# Patient Record
Sex: MALE | Race: WHITE | NOT HISPANIC OR LATINO | Employment: OTHER | URBAN - METROPOLITAN AREA
[De-identification: names, ages, dates, MRNs, and addresses within clinical notes are randomized per-mention and may not be internally consistent; named-entity substitution may affect disease eponyms.]

---

## 2020-12-07 ENCOUNTER — PATIENT MESSAGE (OUTPATIENT)
Dept: UROLOGY | Facility: CLINIC | Age: 70
End: 2020-12-07

## 2020-12-22 ENCOUNTER — OFFICE VISIT (OUTPATIENT)
Dept: UROLOGY | Facility: CLINIC | Age: 70
End: 2020-12-22
Payer: COMMERCIAL

## 2020-12-22 ENCOUNTER — LAB VISIT (OUTPATIENT)
Dept: LAB | Facility: HOSPITAL | Age: 70
End: 2020-12-22
Attending: UROLOGY
Payer: COMMERCIAL

## 2020-12-22 VITALS — TEMPERATURE: 97 F | SYSTOLIC BLOOD PRESSURE: 142 MMHG | WEIGHT: 207.25 LBS | DIASTOLIC BLOOD PRESSURE: 86 MMHG

## 2020-12-22 DIAGNOSIS — R31.29 MICROHEMATURIA: ICD-10-CM

## 2020-12-22 DIAGNOSIS — R97.20 ELEVATED PSA: ICD-10-CM

## 2020-12-22 DIAGNOSIS — R97.20 ELEVATED PSA: Primary | ICD-10-CM

## 2020-12-22 LAB
BILIRUB SERPL-MCNC: NORMAL MG/DL
BLOOD URINE, POC: NORMAL
CLARITY, POC UA: CLEAR
COLOR, POC UA: YELLOW
GLUCOSE UR QL STRIP: NORMAL
KETONES UR QL STRIP: NORMAL
LEUKOCYTE ESTERASE URINE, POC: NORMAL
NITRITE, POC UA: NORMAL
PH, POC UA: 6
PROTEIN, POC: NORMAL
SPECIFIC GRAVITY, POC UA: 1.01
UROBILINOGEN, POC UA: NORMAL

## 2020-12-22 PROCEDURE — 99999 PR PBB SHADOW E&M-EST. PATIENT-LVL III: CPT | Mod: PBBFAC,,, | Performed by: UROLOGY

## 2020-12-22 PROCEDURE — 99213 OFFICE O/P EST LOW 20 MIN: CPT | Mod: 25,S$GLB,, | Performed by: UROLOGY

## 2020-12-22 PROCEDURE — 1126F PR PAIN SEVERITY QUANTIFIED, NO PAIN PRESENT: ICD-10-PCS | Mod: S$GLB,,, | Performed by: UROLOGY

## 2020-12-22 PROCEDURE — 1159F PR MEDICATION LIST DOCUMENTED IN MEDICAL RECORD: ICD-10-PCS | Mod: S$GLB,,, | Performed by: UROLOGY

## 2020-12-22 PROCEDURE — 81002 URINALYSIS NONAUTO W/O SCOPE: CPT | Mod: S$GLB,,, | Performed by: UROLOGY

## 2020-12-22 PROCEDURE — 81001 URINALYSIS AUTO W/SCOPE: CPT

## 2020-12-22 PROCEDURE — 81002 POCT URINE DIPSTICK WITHOUT MICROSCOPE: ICD-10-PCS | Mod: S$GLB,,, | Performed by: UROLOGY

## 2020-12-22 PROCEDURE — 1101F PR PT FALLS ASSESS DOC 0-1 FALLS W/OUT INJ PAST YR: ICD-10-PCS | Mod: CPTII,S$GLB,, | Performed by: UROLOGY

## 2020-12-22 PROCEDURE — 84153 ASSAY OF PSA TOTAL: CPT

## 2020-12-22 PROCEDURE — 3288F PR FALLS RISK ASSESSMENT DOCUMENTED: ICD-10-PCS | Mod: CPTII,S$GLB,, | Performed by: UROLOGY

## 2020-12-22 PROCEDURE — 36415 COLL VENOUS BLD VENIPUNCTURE: CPT | Mod: PO

## 2020-12-22 PROCEDURE — 1101F PT FALLS ASSESS-DOCD LE1/YR: CPT | Mod: CPTII,S$GLB,, | Performed by: UROLOGY

## 2020-12-22 PROCEDURE — 3288F FALL RISK ASSESSMENT DOCD: CPT | Mod: CPTII,S$GLB,, | Performed by: UROLOGY

## 2020-12-22 PROCEDURE — 1126F AMNT PAIN NOTED NONE PRSNT: CPT | Mod: S$GLB,,, | Performed by: UROLOGY

## 2020-12-22 PROCEDURE — 1159F MED LIST DOCD IN RCRD: CPT | Mod: S$GLB,,, | Performed by: UROLOGY

## 2020-12-22 PROCEDURE — 99999 PR PBB SHADOW E&M-EST. PATIENT-LVL III: ICD-10-PCS | Mod: PBBFAC,,, | Performed by: UROLOGY

## 2020-12-22 PROCEDURE — 99213 PR OFFICE/OUTPT VISIT, EST, LEVL III, 20-29 MIN: ICD-10-PCS | Mod: 25,S$GLB,, | Performed by: UROLOGY

## 2020-12-22 RX ORDER — MESALAMINE 1.2 G/1
TABLET, DELAYED RELEASE ORAL
COMMUNITY
Start: 2020-12-21

## 2020-12-22 RX ORDER — SILDENAFIL 100 MG/1
100 TABLET, FILM COATED ORAL DAILY PRN
COMMUNITY
Start: 2020-06-08

## 2020-12-22 RX ORDER — ZOLPIDEM TARTRATE 12.5 MG/1
12.5 TABLET, FILM COATED, EXTENDED RELEASE ORAL
COMMUNITY
Start: 2020-11-16

## 2020-12-22 RX ORDER — MELOXICAM 15 MG/1
15 TABLET ORAL
COMMUNITY
Start: 2020-08-19 | End: 2022-09-07

## 2020-12-22 RX ORDER — PRIMIDONE 50 MG/1
50 TABLET ORAL
COMMUNITY
Start: 2020-08-07 | End: 2021-05-28

## 2020-12-22 RX ORDER — CALCIPOTRIENE 50 UG/G
CREAM TOPICAL
COMMUNITY
Start: 2020-11-17

## 2020-12-22 RX ORDER — ATORVASTATIN CALCIUM 20 MG/1
20 TABLET, FILM COATED ORAL
COMMUNITY
Start: 2020-08-19

## 2020-12-22 RX ORDER — AMITRIPTYLINE HYDROCHLORIDE 100 MG/1
100 TABLET ORAL
COMMUNITY
Start: 2020-11-16

## 2020-12-22 RX ORDER — OLMESARTAN MEDOXOMIL 20 MG/1
TABLET ORAL
COMMUNITY
Start: 2020-11-13

## 2020-12-22 NOTE — PROGRESS NOTES
Chief Complaint   Patient presents with    Follow-up     6mo follow up, former pt from [facility]         History of Present Illness:   Juan C Ferguson is a 70 y.o. male here for follow up elevated PSA. No nocturia. No gross hematuria. No dysuria. No stranguria. Some frequency but drinks tea.     Past  history:   TRUS biopsy 5/2019 PSA of 4.3, benign  MRI PI-RADS 4 12/2019, 67cc prostate  MR/US fusion biopsy 2/2020, benign, PSA 6.23    Review of Systems:   Constitutional: Pt denies fever, chills, change in appetite or unintentional weight loss  HENT: No drooling, loss of hearing, mouth sores, facial swelling  Eyes: No photophobia, visual disturbance or eye pain  Respiratory: No cough, dyspnea, wheezing  Cardiovascular: No chest pain, palpitations or leg swelling  GI: No constipation, diarrhea, nausea, vomiting, melena or hematochezia  : No genital lesions, discharge, nocturnal enuresis  Endocrine: No polydipsia, polyphagia, heat or cold intolerance  Musculoskeletal: No joint swelling, back pain or bone pain  Integument: No color change, rash or wounds  Neurological: No seizures, speech difficulty or tremors  Psychiatric: No confusion, self-harm or Hallucinations    Current Outpatient Medications   Medication Sig Dispense Refill    amitriptyline (ELAVIL) 100 MG tablet Take 100 mg by mouth.      atorvastatin (LIPITOR) 20 MG tablet Take 20 mg by mouth.      calcipotriene (DOVONOX) 0.005 % cream Apply topically.      meloxicam (MOBIC) 15 MG tablet Take 15 mg by mouth.      mesalamine (LIALDA) 1.2 gram TbEC       olmesartan (BENICAR) 20 MG tablet       primidone (MYSOLINE) 50 MG Tab Take 50 mg by mouth.      sildenafiL (VIAGRA) 100 MG tablet Take 100 mg by mouth daily as needed.      zolpidem (AMBIEN CR) 12.5 MG CR tablet Take 12.5 mg by mouth.       No current facility-administered medications for this visit.        Review of patient's allergies indicates:   Allergen Reactions    Sulfa (sulfonamide antibiotics)  Rash       Past medical, family, and social history reviewed as documented in chart with pertinent positive medical, family, and social history detailed in HPI.    Physical Exam  Vitals:    12/22/20 0807   BP: (!) 142/86   Temp: 97.2 °F (36.2 °C)       General: Well-developed, well-nourished, in no acute distress  HEENT: Normocephalic, atraumatic, extraocular eye movements in tact  Neck: supple, trachea midline, no cervical or supraclavicular adenopathy  Respirations: Even and unlabored  Abdomen: soft, Non-tender, Non-distended, no palpable masses, no rebound or guarding  Rectal:>40gram prostate without nodules or tenderness. No gross blood.   Extremities: Moves all extremities equally, atraumatic, no clubbing, cyanosis, edema  Skin: warm and dry, no lesions  Psych: normal affect  Neuro: Alert and oriented x 3. Cranial nerves II-XII grossly intact    Urinalysis  Trace blood    Assessment:   1. Elevated PSA  Prostate Specific Antigen, Diagnostic    Prostate Specific Antigen, Diagnostic   2. Microhematuria  Urinalysis Microscopic         Plan:  Elevated PSA  -     Prostate Specific Antigen, Diagnostic; Future; Expected date: 12/22/2020  -     Prostate Specific Antigen, Diagnostic; Future; Expected date: 06/22/2021    Microhematuria  -     Urinalysis Microscopic        Follow up in about 6 months (around 6/22/2021).

## 2020-12-23 ENCOUNTER — PATIENT MESSAGE (OUTPATIENT)
Dept: UROLOGY | Facility: CLINIC | Age: 70
End: 2020-12-23

## 2020-12-23 DIAGNOSIS — R97.20 ELEVATED PSA: Primary | ICD-10-CM

## 2020-12-23 LAB
COMPLEXED PSA SERPL-MCNC: 11 NG/ML (ref 0–4)
MICROSCOPIC COMMENT: NORMAL
RBC #/AREA URNS AUTO: 1 /HPF (ref 0–4)
WBC #/AREA URNS AUTO: 0 /HPF (ref 0–5)

## 2021-01-05 ENCOUNTER — IMMUNIZATION (OUTPATIENT)
Dept: INTERNAL MEDICINE | Facility: CLINIC | Age: 71
End: 2021-01-05
Payer: MEDICARE

## 2021-01-05 DIAGNOSIS — Z23 NEED FOR VACCINATION: ICD-10-CM

## 2021-01-05 PROCEDURE — 91300 COVID-19, MRNA, LNP-S, PF, 30 MCG/0.3 ML DOSE VACCINE: CPT | Mod: PBBFAC | Performed by: FAMILY MEDICINE

## 2021-01-08 ENCOUNTER — HOSPITAL ENCOUNTER (OUTPATIENT)
Dept: RADIOLOGY | Facility: HOSPITAL | Age: 71
Discharge: HOME | End: 2021-01-08
Attending: UROLOGY
Payer: COMMERCIAL

## 2021-01-08 ENCOUNTER — LAB VISIT (OUTPATIENT)
Dept: LAB | Facility: HOSPITAL | Age: 71
End: 2021-01-08
Attending: UROLOGY
Payer: COMMERCIAL

## 2021-01-08 DIAGNOSIS — R97.20 ELEVATED PSA: ICD-10-CM

## 2021-01-08 LAB
BUN SERPL-MCNC: 18 MG/DL (ref 8–23)
CREAT SERPL-MCNC: 0.9 MG/DL (ref 0.5–1.4)
EST. GFR  (AFRICAN AMERICAN): >60 ML/MIN/1.73 M^2
EST. GFR  (NON AFRICAN AMERICAN): >60 ML/MIN/1.73 M^2

## 2021-01-08 PROCEDURE — 84520 ASSAY OF UREA NITROGEN: CPT

## 2021-01-08 PROCEDURE — 82565 ASSAY OF CREATININE: CPT

## 2021-01-08 PROCEDURE — 72197 MRI PELVIS W/O & W/DYE: CPT | Mod: 26,,, | Performed by: RADIOLOGY

## 2021-01-08 PROCEDURE — 84153 ASSAY OF PSA TOTAL: CPT

## 2021-01-08 PROCEDURE — 72197 MRI PELVIS W/O & W/DYE: CPT | Mod: TC

## 2021-01-08 PROCEDURE — 25500020 PHARM REV CODE 255: Performed by: UROLOGY

## 2021-01-08 PROCEDURE — 72197 MRI PROSTATE W W/O CONTRAST: ICD-10-PCS | Mod: 26,,, | Performed by: RADIOLOGY

## 2021-01-08 PROCEDURE — 36415 COLL VENOUS BLD VENIPUNCTURE: CPT

## 2021-01-08 PROCEDURE — A9585 GADOBUTROL INJECTION: HCPCS | Performed by: UROLOGY

## 2021-01-08 RX ORDER — GADOBUTROL 604.72 MG/ML
10 INJECTION INTRAVENOUS
Status: COMPLETED | OUTPATIENT
Start: 2021-01-08 | End: 2021-01-08

## 2021-01-08 RX ADMIN — GADOBUTROL 10 ML: 604.72 INJECTION INTRAVENOUS at 05:01

## 2021-01-09 LAB — COMPLEXED PSA SERPL-MCNC: 4.9 NG/ML (ref 0–4)

## 2021-01-12 ENCOUNTER — TELEPHONE (OUTPATIENT)
Dept: UROLOGY | Facility: CLINIC | Age: 71
End: 2021-01-12

## 2021-01-27 ENCOUNTER — IMMUNIZATION (OUTPATIENT)
Dept: INTERNAL MEDICINE | Facility: CLINIC | Age: 71
End: 2021-01-27
Payer: MEDICARE

## 2021-01-27 DIAGNOSIS — Z23 NEED FOR VACCINATION: Primary | ICD-10-CM

## 2021-01-27 PROCEDURE — 91300 COVID-19, MRNA, LNP-S, PF, 30 MCG/0.3 ML DOSE VACCINE: CPT | Mod: PBBFAC | Performed by: FAMILY MEDICINE

## 2021-01-27 PROCEDURE — 0002A COVID-19, MRNA, LNP-S, PF, 30 MCG/0.3 ML DOSE VACCINE: CPT | Mod: PBBFAC | Performed by: FAMILY MEDICINE

## 2021-05-28 ENCOUNTER — LAB VISIT (OUTPATIENT)
Dept: LAB | Facility: HOSPITAL | Age: 71
End: 2021-05-28
Attending: UROLOGY
Payer: COMMERCIAL

## 2021-05-28 ENCOUNTER — OFFICE VISIT (OUTPATIENT)
Dept: UROLOGY | Facility: CLINIC | Age: 71
End: 2021-05-28
Payer: COMMERCIAL

## 2021-05-28 VITALS — SYSTOLIC BLOOD PRESSURE: 136 MMHG | DIASTOLIC BLOOD PRESSURE: 94 MMHG | WEIGHT: 209.44 LBS

## 2021-05-28 DIAGNOSIS — R31.29 MICROHEMATURIA: Primary | ICD-10-CM

## 2021-05-28 DIAGNOSIS — R97.20 ELEVATED PSA: ICD-10-CM

## 2021-05-28 LAB
BACTERIA #/AREA URNS AUTO: NORMAL /HPF
BILIRUB SERPL-MCNC: NORMAL MG/DL
BLOOD URINE, POC: 50
CLARITY, POC UA: CLEAR
COLOR, POC UA: YELLOW
COMPLEXED PSA SERPL-MCNC: 4.4 NG/ML (ref 0–4)
GLUCOSE UR QL STRIP: NORMAL
KETONES UR QL STRIP: NORMAL
LEUKOCYTE ESTERASE URINE, POC: NORMAL
MICROSCOPIC COMMENT: NORMAL
NITRITE, POC UA: NORMAL
PH, POC UA: 6
PROTEIN, POC: NORMAL
RBC #/AREA URNS AUTO: 4 /HPF (ref 0–4)
SPECIFIC GRAVITY, POC UA: 1.02
UROBILINOGEN, POC UA: NORMAL
WBC #/AREA URNS AUTO: 0 /HPF (ref 0–5)

## 2021-05-28 PROCEDURE — 84153 ASSAY OF PSA TOTAL: CPT | Performed by: UROLOGY

## 2021-05-28 PROCEDURE — 99999 PR PBB SHADOW E&M-EST. PATIENT-LVL III: ICD-10-PCS | Mod: PBBFAC,,, | Performed by: UROLOGY

## 2021-05-28 PROCEDURE — 1101F PT FALLS ASSESS-DOCD LE1/YR: CPT | Mod: CPTII,S$GLB,, | Performed by: UROLOGY

## 2021-05-28 PROCEDURE — 3288F PR FALLS RISK ASSESSMENT DOCUMENTED: ICD-10-PCS | Mod: CPTII,S$GLB,, | Performed by: UROLOGY

## 2021-05-28 PROCEDURE — 81001 URINALYSIS AUTO W/SCOPE: CPT | Performed by: UROLOGY

## 2021-05-28 PROCEDURE — 99999 PR PBB SHADOW E&M-EST. PATIENT-LVL III: CPT | Mod: PBBFAC,,, | Performed by: UROLOGY

## 2021-05-28 PROCEDURE — 1159F MED LIST DOCD IN RCRD: CPT | Mod: S$GLB,,, | Performed by: UROLOGY

## 2021-05-28 PROCEDURE — 1101F PR PT FALLS ASSESS DOC 0-1 FALLS W/OUT INJ PAST YR: ICD-10-PCS | Mod: CPTII,S$GLB,, | Performed by: UROLOGY

## 2021-05-28 PROCEDURE — 1159F PR MEDICATION LIST DOCUMENTED IN MEDICAL RECORD: ICD-10-PCS | Mod: S$GLB,,, | Performed by: UROLOGY

## 2021-05-28 PROCEDURE — 3288F FALL RISK ASSESSMENT DOCD: CPT | Mod: CPTII,S$GLB,, | Performed by: UROLOGY

## 2021-05-28 PROCEDURE — 81002 URINALYSIS NONAUTO W/O SCOPE: CPT | Mod: S$GLB,,, | Performed by: UROLOGY

## 2021-05-28 PROCEDURE — 1126F PR PAIN SEVERITY QUANTIFIED, NO PAIN PRESENT: ICD-10-PCS | Mod: S$GLB,,, | Performed by: UROLOGY

## 2021-05-28 PROCEDURE — 99213 OFFICE O/P EST LOW 20 MIN: CPT | Mod: S$GLB,,, | Performed by: UROLOGY

## 2021-05-28 PROCEDURE — 1126F AMNT PAIN NOTED NONE PRSNT: CPT | Mod: S$GLB,,, | Performed by: UROLOGY

## 2021-05-28 PROCEDURE — 81002 POCT URINE DIPSTICK WITHOUT MICROSCOPE: ICD-10-PCS | Mod: S$GLB,,, | Performed by: UROLOGY

## 2021-05-28 PROCEDURE — 36415 COLL VENOUS BLD VENIPUNCTURE: CPT | Mod: PO | Performed by: UROLOGY

## 2021-05-28 PROCEDURE — 99213 PR OFFICE/OUTPT VISIT, EST, LEVL III, 20-29 MIN: ICD-10-PCS | Mod: S$GLB,,, | Performed by: UROLOGY

## 2021-06-04 ENCOUNTER — TELEPHONE (OUTPATIENT)
Dept: UROLOGY | Facility: CLINIC | Age: 71
End: 2021-06-04

## 2021-11-30 ENCOUNTER — OFFICE VISIT (OUTPATIENT)
Dept: UROLOGY | Facility: CLINIC | Age: 71
End: 2021-11-30
Payer: MEDICARE

## 2021-11-30 ENCOUNTER — LAB VISIT (OUTPATIENT)
Dept: LAB | Facility: HOSPITAL | Age: 71
End: 2021-11-30
Attending: UROLOGY
Payer: MEDICARE

## 2021-11-30 VITALS
HEIGHT: 70 IN | WEIGHT: 210.13 LBS | DIASTOLIC BLOOD PRESSURE: 90 MMHG | BODY MASS INDEX: 30.08 KG/M2 | SYSTOLIC BLOOD PRESSURE: 160 MMHG

## 2021-11-30 DIAGNOSIS — R97.20 ELEVATED PSA: ICD-10-CM

## 2021-11-30 DIAGNOSIS — R31.29 MICROHEMATURIA: ICD-10-CM

## 2021-11-30 DIAGNOSIS — R97.20 ELEVATED PSA: Primary | ICD-10-CM

## 2021-11-30 LAB — COMPLEXED PSA SERPL-MCNC: 4.3 NG/ML (ref 0–4)

## 2021-11-30 PROCEDURE — 84153 ASSAY OF PSA TOTAL: CPT | Performed by: UROLOGY

## 2021-11-30 PROCEDURE — 4010F ACE/ARB THERAPY RXD/TAKEN: CPT | Mod: CPTII,S$GLB,, | Performed by: UROLOGY

## 2021-11-30 PROCEDURE — 4010F PR ACE/ARB THEARPY RXD/TAKEN: ICD-10-PCS | Mod: CPTII,S$GLB,, | Performed by: UROLOGY

## 2021-11-30 PROCEDURE — 99999 PR PBB SHADOW E&M-EST. PATIENT-LVL III: CPT | Mod: PBBFAC,,, | Performed by: UROLOGY

## 2021-11-30 PROCEDURE — 81001 URINALYSIS AUTO W/SCOPE: CPT | Performed by: UROLOGY

## 2021-11-30 PROCEDURE — 99999 PR PBB SHADOW E&M-EST. PATIENT-LVL III: ICD-10-PCS | Mod: PBBFAC,,, | Performed by: UROLOGY

## 2021-11-30 PROCEDURE — 99213 OFFICE O/P EST LOW 20 MIN: CPT | Mod: S$GLB,,, | Performed by: UROLOGY

## 2021-11-30 PROCEDURE — 99213 PR OFFICE/OUTPT VISIT, EST, LEVL III, 20-29 MIN: ICD-10-PCS | Mod: S$GLB,,, | Performed by: UROLOGY

## 2021-11-30 PROCEDURE — 36415 COLL VENOUS BLD VENIPUNCTURE: CPT | Mod: PO | Performed by: UROLOGY

## 2021-11-30 RX ORDER — ASPIRIN 81 MG/1
1 TABLET ORAL DAILY
COMMUNITY
Start: 2021-01-13 | End: 2022-01-13

## 2021-11-30 RX ORDER — CLOBETASOL PROPIONATE 0.5 MG/G
1 CREAM TOPICAL 2 TIMES DAILY
COMMUNITY
Start: 2021-08-03

## 2021-12-01 PROBLEM — R97.20 ELEVATED PSA: Status: ACTIVE | Noted: 2021-12-01

## 2021-12-01 LAB
MICROSCOPIC COMMENT: NORMAL
RBC #/AREA URNS AUTO: 2 /HPF (ref 0–4)
WBC #/AREA URNS AUTO: 0 /HPF (ref 0–5)

## 2022-02-03 DIAGNOSIS — M79.672 LEFT FOOT PAIN: Primary | ICD-10-CM

## 2022-02-04 ENCOUNTER — HOSPITAL ENCOUNTER (OUTPATIENT)
Dept: RADIOLOGY | Facility: HOSPITAL | Age: 72
Discharge: HOME | End: 2022-02-04
Attending: PODIATRIST
Payer: MEDICARE

## 2022-02-04 ENCOUNTER — OFFICE VISIT (OUTPATIENT)
Dept: PODIATRY | Facility: CLINIC | Age: 72
End: 2022-02-04
Payer: MEDICARE

## 2022-02-04 VITALS — HEIGHT: 70 IN | BODY MASS INDEX: 30.08 KG/M2 | WEIGHT: 210.13 LBS

## 2022-02-04 DIAGNOSIS — M79.672 LEFT FOOT PAIN: ICD-10-CM

## 2022-02-04 DIAGNOSIS — L84 CORN OR CALLUS: Primary | ICD-10-CM

## 2022-02-04 DIAGNOSIS — M79.675 GREAT TOE PAIN, LEFT: ICD-10-CM

## 2022-02-04 PROCEDURE — 4010F ACE/ARB THERAPY RXD/TAKEN: CPT | Mod: CPTII,S$GLB,, | Performed by: PODIATRIST

## 2022-02-04 PROCEDURE — 3288F PR FALLS RISK ASSESSMENT DOCUMENTED: ICD-10-PCS | Mod: CPTII,S$GLB,, | Performed by: PODIATRIST

## 2022-02-04 PROCEDURE — 73630 XR FOOT COMPLETE 3 VIEW LEFT: ICD-10-PCS | Mod: 26,LT,, | Performed by: RADIOLOGY

## 2022-02-04 PROCEDURE — 99999 PR PBB SHADOW E&M-EST. PATIENT-LVL II: CPT | Mod: PBBFAC,,, | Performed by: PODIATRIST

## 2022-02-04 PROCEDURE — 73630 X-RAY EXAM OF FOOT: CPT | Mod: TC,LT

## 2022-02-04 PROCEDURE — 99203 PR OFFICE/OUTPT VISIT, NEW, LEVL III, 30-44 MIN: ICD-10-PCS | Mod: S$GLB,,, | Performed by: PODIATRIST

## 2022-02-04 PROCEDURE — 99203 OFFICE O/P NEW LOW 30 MIN: CPT | Mod: S$GLB,,, | Performed by: PODIATRIST

## 2022-02-04 PROCEDURE — 73630 X-RAY EXAM OF FOOT: CPT | Mod: 26,LT,, | Performed by: RADIOLOGY

## 2022-02-04 PROCEDURE — 3288F FALL RISK ASSESSMENT DOCD: CPT | Mod: CPTII,S$GLB,, | Performed by: PODIATRIST

## 2022-02-04 PROCEDURE — 1101F PR PT FALLS ASSESS DOC 0-1 FALLS W/OUT INJ PAST YR: ICD-10-PCS | Mod: CPTII,S$GLB,, | Performed by: PODIATRIST

## 2022-02-04 PROCEDURE — 1101F PT FALLS ASSESS-DOCD LE1/YR: CPT | Mod: CPTII,S$GLB,, | Performed by: PODIATRIST

## 2022-02-04 PROCEDURE — 4010F PR ACE/ARB THEARPY RXD/TAKEN: ICD-10-PCS | Mod: CPTII,S$GLB,, | Performed by: PODIATRIST

## 2022-02-04 PROCEDURE — 99999 PR PBB SHADOW E&M-EST. PATIENT-LVL II: ICD-10-PCS | Mod: PBBFAC,,, | Performed by: PODIATRIST

## 2022-02-04 PROCEDURE — 3008F BODY MASS INDEX DOCD: CPT | Mod: CPTII,S$GLB,, | Performed by: PODIATRIST

## 2022-02-04 PROCEDURE — 1125F AMNT PAIN NOTED PAIN PRSNT: CPT | Mod: CPTII,S$GLB,, | Performed by: PODIATRIST

## 2022-02-04 PROCEDURE — 3008F PR BODY MASS INDEX (BMI) DOCUMENTED: ICD-10-PCS | Mod: CPTII,S$GLB,, | Performed by: PODIATRIST

## 2022-02-04 PROCEDURE — 1125F PR PAIN SEVERITY QUANTIFIED, PAIN PRESENT: ICD-10-PCS | Mod: CPTII,S$GLB,, | Performed by: PODIATRIST

## 2022-02-04 NOTE — PROGRESS NOTES
PODIATRIC MEDICINE AND SURGERY  2/4/2022    PCP:  Primary Doctor No    CHIEF COMPLAINT   Chief Complaint   Patient presents with    Toe Pain     C/o big toe pain on left foot, rates pain 6/10, pt wears socks and shoes, nondiabetic, last seen PCP Dr. Jose Tran on 1/18/22.       HPI:    Juan C Ferguson is a 71 y.o. male who has no past medical history on file.   Juan C presents to clinic today complaining of sharp pain to left great toe. Pt states he has sharp pain on plantar surface of left great toe. Symptoms have been present for two weeks. Denies any trauma. Symptoms are only aggravated with first step in AM and then quickly dissipates. He denies stepping on foreign body. Pain is 6/10 when present.      Patient denies other pedal complaints at this time.     PMH  History reviewed. No pertinent past medical history.    PROBLEM LIST  Patient Active Problem List    Diagnosis Date Noted    Elevated PSA 12/01/2021       MEDS  Current Outpatient Medications on File Prior to Visit   Medication Sig Dispense Refill    amitriptyline (ELAVIL) 100 MG tablet Take 100 mg by mouth.      atorvastatin (LIPITOR) 20 MG tablet Take 20 mg by mouth.      calcipotriene (DOVONOX) 0.005 % cream Apply topically.      clobetasoL (TEMOVATE) 0.05 % cream Apply 1 application topically 2 (two) times daily.      meloxicam (MOBIC) 15 MG tablet Take 15 mg by mouth.      mesalamine (LIALDA) 1.2 gram TbEC       olmesartan (BENICAR) 20 MG tablet       sildenafiL (VIAGRA) 100 MG tablet Take 100 mg by mouth daily as needed.      zolpidem (AMBIEN CR) 12.5 MG CR tablet Take 12.5 mg by mouth.      aspirin (ECOTRIN) 81 MG EC tablet Take 1 tablet by mouth once daily.      primidone (MYSOLINE) 50 MG Tab Take 50 mg by mouth.       No current facility-administered medications on file prior to visit.       Medication List with Changes/Refills   Current Medications    AMITRIPTYLINE (ELAVIL) 100 MG TABLET    Take 100 mg by mouth.    ASPIRIN  "(ECOTRIN) 81 MG EC TABLET    Take 1 tablet by mouth once daily.    ATORVASTATIN (LIPITOR) 20 MG TABLET    Take 20 mg by mouth.    CALCIPOTRIENE (DOVONOX) 0.005 % CREAM    Apply topically.    CLOBETASOL (TEMOVATE) 0.05 % CREAM    Apply 1 application topically 2 (two) times daily.    MELOXICAM (MOBIC) 15 MG TABLET    Take 15 mg by mouth.    MESALAMINE (LIALDA) 1.2 GRAM TBEC        OLMESARTAN (BENICAR) 20 MG TABLET        PRIMIDONE (MYSOLINE) 50 MG TAB    Take 50 mg by mouth.    SILDENAFIL (VIAGRA) 100 MG TABLET    Take 100 mg by mouth daily as needed.    ZOLPIDEM (AMBIEN CR) 12.5 MG CR TABLET    Take 12.5 mg by mouth.       PSH   History reviewed. No pertinent surgical history.     ALL  Review of patient's allergies indicates:   Allergen Reactions    Sulfa (sulfonamide antibiotics) Rash       SOC     Social History     Tobacco Use    Smoking status: Former Smoker    Smokeless tobacco: Never Used   Substance Use Topics    Alcohol use: Yes    Drug use: Never         FAMILY HX  History reviewed. No pertinent family history.         REVIEW OF SYSTEMS  General: This patient is well-developed, well-nourished and appears stated age, well-oriented to person, place and time, and cooperative and pleasant on today's visit  Constitutional: Negative for chills and fever.   Respiratory: Negative for shortness of breath.    Cardiovascular: Negative for chest pain, palpitations, orthopnea  Gastrointestinal: Negative for diarrhea, nausea and vomiting.   Musculoskeletal: Positive for above noted in HPI  Skin: Positive for skin and/or nail changes   Neurological: Negative for tingling and sensory changes  Peripheral Vascular: no claudication or cyanosis  Psychiatric/Behavioral: Negative for altered mental status     PHYSICAL EXAM:      Vitals:    02/04/22 1136   Weight: 95.3 kg (210 lb 1.6 oz)   Height: 5' 10" (1.778 m)   PainSc:   6   PainLoc: Toe         LOWER EXTREMITY PHYSICAL EXAM  VASCULAR  Dorsalis pedis and posterior tibial " pulses palpable 2/4 bilaterally. Capillary refill time immediate to the toes. Feet are warm to the touch. Skin temperature warm to warm from proximally to distally There are no varicosities, telangiectasias noted to bilateral foot and ankle regions. There are no ecchymoses noted to bilateral foot and ankle regions. There is no gross lower extremity edema.    DERMATOLOGIC  Skin moist with healthy texture and turgor.There are no open ulcerations, lacerations, or fissures to bilateral foot and ankle regions. There are no signs of infection as there is no erythema, no proximal-extending lymphangiitis, no fluctuance, or crepitus noted on palpation to bilateral foot and ankle regions. There is no interdigital maceration.   There is hyperkeratotic lesions noted plantar hallux    NEUROLOGIC  Epicritic sensation is intact as the patient is able to sense light touch to bilateral foot and ankle regions. Achilles and patellar deep tendon reflexes intact. Babinski reflex absent    ORTHOPEDIC/BIOMECHANICAL  No symptomatic structural abnormalities noted. Muscle strength AT/EHL/EDL/PT: 5/5; Achilles/Gastroc/Soleus: 5/5; PB/PL: 5/5 Muscle tone is normal.    IMAGING   Reviewed by me and I agree with radiologist finding      ASSESSMENT     Encounter Diagnoses   Name Primary?    Corn or callus Yes    Left foot pain     Great toe pain, left          PLAN  Patient was educated about clinical and imaging findings, and verbalizes understanding of above.     Diagnoses and all orders for this visit:  Corn or callus    Left foot pain    Great toe pain, left      -With patient's permission via verbal consent, the involved area was cleansed with an alcohol swab. Trimming of hyperkeratotic lesions deep to epidermal layer x 1 was performed with a #15 blade without incident. Patient relates relief following the procedure. Patient will continue to monitor the areas daily, inspect feet, wear protective shoe gear when ambulatory, moisturizer to  maintain skin integrity.    -Recommendations on purchase of Urea 40 and/or Amlactin was provided for calluses. Metatarsal pad dispensed and patient was instructed on how to apply for offloading callus. Shoe recommendations provided for accomodative foot wear.    Disclaimer:  This note may have been prepared using voice recognition software, it may have not been extensively proofed, as such there could be errors within the text such as sound alike errors.         Future Appointments   Date Time Provider Department Left Hand   5/30/2022  9:30 AM GUICHO HOPSON [facility] LAB [place]   5/31/2022  9:40 AM Suzi Tolentino MD [facility] UROLOGY [place]       Report Electronically Signed By:     Shelley Swift DPM   Podiatry  [facility]-   2/4/2022

## 2022-05-25 ENCOUNTER — TELEPHONE (OUTPATIENT)
Dept: UROLOGY | Facility: CLINIC | Age: 72
End: 2022-05-25
Payer: MEDICARE

## 2022-05-26 ENCOUNTER — PATIENT MESSAGE (OUTPATIENT)
Dept: UROLOGY | Facility: CLINIC | Age: 72
End: 2022-05-26
Payer: MEDICARE

## 2022-06-30 ENCOUNTER — OFFICE VISIT (OUTPATIENT)
Dept: UROLOGY | Facility: CLINIC | Age: 72
End: 2022-06-30
Payer: MEDICARE

## 2022-06-30 ENCOUNTER — LAB VISIT (OUTPATIENT)
Dept: LAB | Facility: HOSPITAL | Age: 72
End: 2022-06-30
Attending: UROLOGY
Payer: MEDICARE

## 2022-06-30 VITALS
DIASTOLIC BLOOD PRESSURE: 80 MMHG | SYSTOLIC BLOOD PRESSURE: 120 MMHG | WEIGHT: 210.13 LBS | BODY MASS INDEX: 30.15 KG/M2

## 2022-06-30 DIAGNOSIS — R97.20 ELEVATED PSA: ICD-10-CM

## 2022-06-30 DIAGNOSIS — R31.29 MICROHEMATURIA: ICD-10-CM

## 2022-06-30 DIAGNOSIS — N52.9 ERECTILE DYSFUNCTION, UNSPECIFIED ERECTILE DYSFUNCTION TYPE: Primary | ICD-10-CM

## 2022-06-30 DIAGNOSIS — N52.9 ERECTILE DYSFUNCTION, UNSPECIFIED ERECTILE DYSFUNCTION TYPE: ICD-10-CM

## 2022-06-30 LAB — COMPLEXED PSA SERPL-MCNC: 6.5 NG/ML (ref 0–4)

## 2022-06-30 PROCEDURE — 1101F PT FALLS ASSESS-DOCD LE1/YR: CPT | Mod: CPTII,S$GLB,, | Performed by: UROLOGY

## 2022-06-30 PROCEDURE — 82040 ASSAY OF SERUM ALBUMIN: CPT | Performed by: UROLOGY

## 2022-06-30 PROCEDURE — 4010F ACE/ARB THERAPY RXD/TAKEN: CPT | Mod: CPTII,S$GLB,, | Performed by: UROLOGY

## 2022-06-30 PROCEDURE — 3008F BODY MASS INDEX DOCD: CPT | Mod: CPTII,S$GLB,, | Performed by: UROLOGY

## 2022-06-30 PROCEDURE — 99999 PR PBB SHADOW E&M-EST. PATIENT-LVL III: CPT | Mod: PBBFAC,,, | Performed by: UROLOGY

## 2022-06-30 PROCEDURE — 99214 PR OFFICE/OUTPT VISIT, EST, LEVL IV, 30-39 MIN: ICD-10-PCS | Mod: S$GLB,,, | Performed by: UROLOGY

## 2022-06-30 PROCEDURE — 1159F PR MEDICATION LIST DOCUMENTED IN MEDICAL RECORD: ICD-10-PCS | Mod: CPTII,S$GLB,, | Performed by: UROLOGY

## 2022-06-30 PROCEDURE — 84153 ASSAY OF PSA TOTAL: CPT | Performed by: UROLOGY

## 2022-06-30 PROCEDURE — 1159F MED LIST DOCD IN RCRD: CPT | Mod: CPTII,S$GLB,, | Performed by: UROLOGY

## 2022-06-30 PROCEDURE — 3008F PR BODY MASS INDEX (BMI) DOCUMENTED: ICD-10-PCS | Mod: CPTII,S$GLB,, | Performed by: UROLOGY

## 2022-06-30 PROCEDURE — 99214 OFFICE O/P EST MOD 30 MIN: CPT | Mod: S$GLB,,, | Performed by: UROLOGY

## 2022-06-30 PROCEDURE — 4010F PR ACE/ARB THEARPY RXD/TAKEN: ICD-10-PCS | Mod: CPTII,S$GLB,, | Performed by: UROLOGY

## 2022-06-30 PROCEDURE — 3074F SYST BP LT 130 MM HG: CPT | Mod: CPTII,S$GLB,, | Performed by: UROLOGY

## 2022-06-30 PROCEDURE — 1101F PR PT FALLS ASSESS DOC 0-1 FALLS W/OUT INJ PAST YR: ICD-10-PCS | Mod: CPTII,S$GLB,, | Performed by: UROLOGY

## 2022-06-30 PROCEDURE — 3288F PR FALLS RISK ASSESSMENT DOCUMENTED: ICD-10-PCS | Mod: CPTII,S$GLB,, | Performed by: UROLOGY

## 2022-06-30 PROCEDURE — 81001 URINALYSIS AUTO W/SCOPE: CPT | Performed by: UROLOGY

## 2022-06-30 PROCEDURE — 3074F PR MOST RECENT SYSTOLIC BLOOD PRESSURE < 130 MM HG: ICD-10-PCS | Mod: CPTII,S$GLB,, | Performed by: UROLOGY

## 2022-06-30 PROCEDURE — 99999 PR PBB SHADOW E&M-EST. PATIENT-LVL III: ICD-10-PCS | Mod: PBBFAC,,, | Performed by: UROLOGY

## 2022-06-30 PROCEDURE — 3288F FALL RISK ASSESSMENT DOCD: CPT | Mod: CPTII,S$GLB,, | Performed by: UROLOGY

## 2022-06-30 PROCEDURE — 1126F PR PAIN SEVERITY QUANTIFIED, NO PAIN PRESENT: ICD-10-PCS | Mod: CPTII,S$GLB,, | Performed by: UROLOGY

## 2022-06-30 PROCEDURE — 3079F PR MOST RECENT DIASTOLIC BLOOD PRESSURE 80-89 MM HG: ICD-10-PCS | Mod: CPTII,S$GLB,, | Performed by: UROLOGY

## 2022-06-30 PROCEDURE — 1126F AMNT PAIN NOTED NONE PRSNT: CPT | Mod: CPTII,S$GLB,, | Performed by: UROLOGY

## 2022-06-30 PROCEDURE — 3079F DIAST BP 80-89 MM HG: CPT | Mod: CPTII,S$GLB,, | Performed by: UROLOGY

## 2022-06-30 PROCEDURE — 36415 COLL VENOUS BLD VENIPUNCTURE: CPT | Mod: PN | Performed by: UROLOGY

## 2022-06-30 NOTE — PROGRESS NOTES
CC: follow up elevated PSA    History of Present Illness:     6/30/22-Stream is okay. No gross hematuria. He does report some urgency, but manageable. Nocturia x 1. Libido is low. Viagra doesn't work well.   11/30/21-good stream. No gross hematuria. No dysuria. No stranguria.   5/28/21-Pt is a 71yo male here for follow up of elevated PSA. No nocturia. Stream is okay. No gross hematuria or dysuria.     Past  history:   TRUS biopsy 5/2019 PSA of 4.3, benign  MRI PI-RADS 4 12/2019, 67cc prostate  MR/US fusion biopsy 2/2020, benign, PSA 6.23  MRI 1/2021 with PI RADS 4 lesion 7mm right peripheral zone apex, stable compared to prior MRI--elected to defer repeat biopsy, PSA decreased    Review of Systems   Constitutional: Negative for chills and fever.   Gastrointestinal: Negative for abdominal pain.   Genitourinary: Negative for difficulty urinating and hematuria.   Neurological: Negative for numbness and headaches.   All other systems reviewed and are negative.      Current Outpatient Medications   Medication Sig Dispense Refill    amitriptyline (ELAVIL) 100 MG tablet Take 100 mg by mouth.      atorvastatin (LIPITOR) 20 MG tablet Take 20 mg by mouth.      calcipotriene (DOVONOX) 0.005 % cream Apply topically.      clobetasoL (TEMOVATE) 0.05 % cream Apply 1 application topically 2 (two) times daily.      meloxicam (MOBIC) 15 MG tablet Take 15 mg by mouth.      mesalamine (LIALDA) 1.2 gram TbEC       olmesartan (BENICAR) 20 MG tablet       sildenafiL (VIAGRA) 100 MG tablet Take 100 mg by mouth daily as needed.      zolpidem (AMBIEN CR) 12.5 MG CR tablet Take 12.5 mg by mouth.      aspirin (ECOTRIN) 81 MG EC tablet Take 1 tablet by mouth once daily.      primidone (MYSOLINE) 50 MG Tab Take 50 mg by mouth.       No current facility-administered medications for this visit.       Review of patient's allergies indicates:   Allergen Reactions    Sulfa (sulfonamide antibiotics) Rash       Past medical, family, and  social history reviewed as documented in chart with pertinent positive medical, family, and social history detailed in HPI.    Physical Exam  Vitals:    06/30/22 1118   BP: 120/80       General: Well-developed, well-nourished, in no acute distress  HEENT: Normocephalic, atraumatic, extraocular eye movements in tact  Neck: supple, trachea midline, no cervical or supraclavicular adenopathy  Respirations: Even and unlabored  Abdomen: soft, Non-tender, Non-distended, no palpable masses, no rebound or guarding  Rectal: 6/30/22->40gram prostate without nodules or tenderness. No gross blood.   Extremities: Moves all extremities equally, atraumatic, no clubbing, cyanosis, edema  Skin: warm and dry, no lesions  Psych: normal affect  Neuro: Alert and oriented x 3. Cranial nerves II-XII grossly intact    Urinalysis  Trace blood, otherwise negative    Lab Results   Component Value Date    PSA 4.9 (H) 01/08/2021       Assessment:   1. Erectile dysfunction, unspecified erectile dysfunction type  TESTOSTERONE PANEL   2. Elevated PSA  Prostate Specific Antigen, Diagnostic    Prostate Specific Antigen, Diagnostic   3. Microhematuria  Urinalysis Microscopic         Plan:  Erectile dysfunction, unspecified erectile dysfunction type  -     TESTOSTERONE PANEL; Future; Expected date: 06/30/2022    Elevated PSA  -     Prostate Specific Antigen, Diagnostic; Future; Expected date: 06/30/2022  -     Prostate Specific Antigen, Diagnostic; Future; Expected date: 12/30/2022    Microhematuria  -     Urinalysis Microscopic    Discussed management options for ED, including different PDE5i, ICI, JENNIFER and IPP. Pt would like to check testosterone level first.   Continue viagra for now  Follow up in about 6 months (around 12/30/2022).

## 2022-07-01 LAB
MICROSCOPIC COMMENT: ABNORMAL
RBC #/AREA URNS AUTO: 5 /HPF (ref 0–4)
WBC #/AREA URNS AUTO: 0 /HPF (ref 0–5)

## 2022-07-07 LAB
ALBUMIN SERPL-MCNC: 3.9 G/DL (ref 3.6–5.1)
SHBG SERPL-SCNC: 84 NMOL/L (ref 22–77)
TESTOST FREE SERPL-MCNC: 36.6 PG/ML (ref 6–73)
TESTOST SERPL-MCNC: 612 NG/DL (ref 250–1100)
TESTOSTERONE.FREE+WB SERPL-MCNC: 65.8 NG/DL (ref 15–150)

## 2022-08-17 ENCOUNTER — OFFICE VISIT (OUTPATIENT)
Dept: PODIATRY | Facility: CLINIC | Age: 72
End: 2022-08-17
Payer: MEDICARE

## 2022-08-17 ENCOUNTER — HOSPITAL ENCOUNTER (OUTPATIENT)
Dept: RADIOLOGY | Facility: HOSPITAL | Age: 72
Discharge: HOME | End: 2022-08-17
Attending: PODIATRIST
Payer: MEDICARE

## 2022-08-17 VITALS — HEIGHT: 70 IN | BODY MASS INDEX: 30.06 KG/M2 | WEIGHT: 210 LBS

## 2022-08-17 DIAGNOSIS — M79.671 RIGHT FOOT PAIN: ICD-10-CM

## 2022-08-17 DIAGNOSIS — M72.2 PLANTAR FASCIAL FIBROMATOSIS OF RIGHT FOOT: ICD-10-CM

## 2022-08-17 DIAGNOSIS — M79.671 ARCH PAIN OF RIGHT FOOT: ICD-10-CM

## 2022-08-17 DIAGNOSIS — M79.671 RIGHT FOOT PAIN: Primary | ICD-10-CM

## 2022-08-17 PROCEDURE — 99999 PR PBB SHADOW E&M-EST. PATIENT-LVL III: CPT | Mod: PBBFAC,,, | Performed by: PODIATRIST

## 2022-08-17 PROCEDURE — 99999 PR PBB SHADOW E&M-EST. PATIENT-LVL III: ICD-10-PCS | Mod: PBBFAC,,, | Performed by: PODIATRIST

## 2022-08-17 PROCEDURE — 4010F ACE/ARB THERAPY RXD/TAKEN: CPT | Mod: CPTII,S$GLB,, | Performed by: PODIATRIST

## 2022-08-17 PROCEDURE — 3288F FALL RISK ASSESSMENT DOCD: CPT | Mod: CPTII,S$GLB,, | Performed by: PODIATRIST

## 2022-08-17 PROCEDURE — 73630 X-RAY EXAM OF FOOT: CPT | Mod: TC,FY,PO,RT

## 2022-08-17 PROCEDURE — 3008F PR BODY MASS INDEX (BMI) DOCUMENTED: ICD-10-PCS | Mod: CPTII,S$GLB,, | Performed by: PODIATRIST

## 2022-08-17 PROCEDURE — 99214 PR OFFICE/OUTPT VISIT, EST, LEVL IV, 30-39 MIN: ICD-10-PCS | Mod: S$GLB,,, | Performed by: PODIATRIST

## 2022-08-17 PROCEDURE — 73630 XR FOOT COMPLETE 3 VIEW RIGHT: ICD-10-PCS | Mod: 26,RT,, | Performed by: RADIOLOGY

## 2022-08-17 PROCEDURE — 1101F PR PT FALLS ASSESS DOC 0-1 FALLS W/OUT INJ PAST YR: ICD-10-PCS | Mod: CPTII,S$GLB,, | Performed by: PODIATRIST

## 2022-08-17 PROCEDURE — 99214 OFFICE O/P EST MOD 30 MIN: CPT | Mod: S$GLB,,, | Performed by: PODIATRIST

## 2022-08-17 PROCEDURE — 4010F PR ACE/ARB THEARPY RXD/TAKEN: ICD-10-PCS | Mod: CPTII,S$GLB,, | Performed by: PODIATRIST

## 2022-08-17 PROCEDURE — 1125F PR PAIN SEVERITY QUANTIFIED, PAIN PRESENT: ICD-10-PCS | Mod: CPTII,S$GLB,, | Performed by: PODIATRIST

## 2022-08-17 PROCEDURE — 1159F MED LIST DOCD IN RCRD: CPT | Mod: CPTII,S$GLB,, | Performed by: PODIATRIST

## 2022-08-17 PROCEDURE — 1159F PR MEDICATION LIST DOCUMENTED IN MEDICAL RECORD: ICD-10-PCS | Mod: CPTII,S$GLB,, | Performed by: PODIATRIST

## 2022-08-17 PROCEDURE — 3288F PR FALLS RISK ASSESSMENT DOCUMENTED: ICD-10-PCS | Mod: CPTII,S$GLB,, | Performed by: PODIATRIST

## 2022-08-17 PROCEDURE — 73630 X-RAY EXAM OF FOOT: CPT | Mod: 26,RT,, | Performed by: RADIOLOGY

## 2022-08-17 PROCEDURE — 3008F BODY MASS INDEX DOCD: CPT | Mod: CPTII,S$GLB,, | Performed by: PODIATRIST

## 2022-08-17 PROCEDURE — 1125F AMNT PAIN NOTED PAIN PRSNT: CPT | Mod: CPTII,S$GLB,, | Performed by: PODIATRIST

## 2022-08-17 PROCEDURE — 1101F PT FALLS ASSESS-DOCD LE1/YR: CPT | Mod: CPTII,S$GLB,, | Performed by: PODIATRIST

## 2022-08-17 RX ORDER — METHYLPREDNISOLONE 4 MG/1
TABLET ORAL
Qty: 1 EACH | Refills: 0 | Status: SHIPPED | OUTPATIENT
Start: 2022-08-17

## 2022-08-17 NOTE — PROGRESS NOTES
PODIATRIC MEDICINE AND SURGERY  8/17/2022    PCP: Dr. Jose Tran, NP    CHIEF COMPLAINT   Chief Complaint   Patient presents with    Foot Pain     C/o right foot pain, plantar aspect, rates pain 3/10, x 1 month, non diabetic wears tennis shoes and socks, Last seen PCP Jose Tran 07/13/2022       HPI:    Juan C Ferguson is a 72 y.o. male who has no past medical history on file.   Juan C presents to clinic today complaining of  pain to right foot plantar medial arch.  Patient denies any trauma.  Patient states symptoms started approximately 1 month ago in which he noted pain on medial band of plantar fascia.  Pain is 3/10 on pain scale.  He has not had any treatment for symptoms.  He is unable to exercise at the length/distance that he is usually able to walk.  He does have antalgic gait.  She  Patient denies other pedal complaints at this time.     PMH  History reviewed. No pertinent past medical history.    PROBLEM LIST  Patient Active Problem List    Diagnosis Date Noted    Elevated PSA 12/01/2021       MEDS  Current Outpatient Medications on File Prior to Visit   Medication Sig Dispense Refill    amitriptyline (ELAVIL) 100 MG tablet Take 100 mg by mouth.      atorvastatin (LIPITOR) 20 MG tablet Take 20 mg by mouth.      calcipotriene (DOVONOX) 0.005 % cream Apply topically.      clobetasoL (TEMOVATE) 0.05 % cream Apply 1 application topically 2 (two) times daily.      meloxicam (MOBIC) 15 MG tablet Take 15 mg by mouth.      mesalamine (LIALDA) 1.2 gram TbEC       olmesartan (BENICAR) 20 MG tablet       sildenafiL (VIAGRA) 100 MG tablet Take 100 mg by mouth daily as needed.      zolpidem (AMBIEN CR) 12.5 MG CR tablet Take 12.5 mg by mouth.      aspirin (ECOTRIN) 81 MG EC tablet Take 1 tablet by mouth once daily.      primidone (MYSOLINE) 50 MG Tab Take 50 mg by mouth.       No current facility-administered medications on file prior to visit.       Medication List with Changes/Refills   New  Medications    METHYLPREDNISOLONE (MEDROL DOSEPACK) 4 MG TABLET    use as directed   Current Medications    AMITRIPTYLINE (ELAVIL) 100 MG TABLET    Take 100 mg by mouth.    ASPIRIN (ECOTRIN) 81 MG EC TABLET    Take 1 tablet by mouth once daily.    ATORVASTATIN (LIPITOR) 20 MG TABLET    Take 20 mg by mouth.    CALCIPOTRIENE (DOVONOX) 0.005 % CREAM    Apply topically.    CLOBETASOL (TEMOVATE) 0.05 % CREAM    Apply 1 application topically 2 (two) times daily.    MELOXICAM (MOBIC) 15 MG TABLET    Take 15 mg by mouth.    MESALAMINE (LIALDA) 1.2 GRAM TBEC        OLMESARTAN (BENICAR) 20 MG TABLET        PRIMIDONE (MYSOLINE) 50 MG TAB    Take 50 mg by mouth.    SILDENAFIL (VIAGRA) 100 MG TABLET    Take 100 mg by mouth daily as needed.    ZOLPIDEM (AMBIEN CR) 12.5 MG CR TABLET    Take 12.5 mg by mouth.       PSH   History reviewed. No pertinent surgical history.     ALL  Review of patient's allergies indicates:   Allergen Reactions    Sulfa (sulfonamide antibiotics) Rash       SOC     Social History     Tobacco Use    Smoking status: Former Smoker    Smokeless tobacco: Never Used   Substance Use Topics    Alcohol use: Yes    Drug use: Never         FAMILY HX  History reviewed. No pertinent family history.         REVIEW OF SYSTEMS  General: This patient is well-developed, well-nourished and appears stated age, well-oriented to person, place and time, and cooperative and pleasant on today's visit  Constitutional: Negative for chills and fever.   Respiratory: Negative for shortness of breath.    Cardiovascular: Negative for chest pain, palpitations, orthopnea  Gastrointestinal: Negative for diarrhea, nausea and vomiting.   Musculoskeletal: Positive for above noted in HPI  Skin: Positive for skin and/or nail changes   Neurological: Negative for tingling and sensory changes  Peripheral Vascular: no claudication or cyanosis  Psychiatric/Behavioral: Negative for altered mental status     PHYSICAL EXAM:      Vitals:     "08/17/22 0818   Weight: 95.3 kg (210 lb)   Height: 5' 10" (1.778 m)   PainSc:   3         LOWER EXTREMITY PHYSICAL EXAM  VASCULAR  Dorsalis pedis and posterior tibial pulses palpable 2/4 bilaterally. Capillary refill time immediate to the toes. Feet are warm to the touch. Skin temperature warm to warm from proximally to distally There are no varicosities, telangiectasias noted to bilateral foot and ankle regions. There are no ecchymoses noted to bilateral foot and ankle regions. There is no gross lower extremity edema.    DERMATOLOGIC  Skin moist with healthy texture and turgor.There are no open ulcerations, lacerations, or fissures to bilateral foot and ankle regions. There are no signs of infection as there is no erythema, no proximal-extending lymphangiitis, no fluctuance, or crepitus noted on palpation to bilateral foot and ankle regions. There is no interdigital maceration.   There is hyperkeratotic lesions noted plantar hallux    NEUROLOGIC  Epicritic sensation is intact as the patient is able to sense light touch to bilateral foot and ankle regions. Achilles and patellar deep tendon reflexes intact. Babinski reflex absent    ORTHOPEDIC/BIOMECHANICAL  TTP taut medial band of plantar fascia RIGHT foot. Muscle strength AT/EHL/EDL/PT: 5/5; Achilles/Gastroc/Soleus: 5/5; PB/PL: 5/5 Muscle tone is normal.    IMAGING  ordered    ASSESSMENT     Encounter Diagnoses   Name Primary?    Right foot pain Yes    Arch pain of right foot     Plantar fascial fibromatosis of right foot          PLAN  Patient was educated about clinical and imaging findings, and verbalizes understanding of above.     Diagnoses and all orders for this visit:  Right foot pain  -     X-Ray Foot Complete Right; Future; Expected date: 08/17/2022    Arch pain of right foot  -     X-Ray Foot Complete Right; Future; Expected date: 08/17/2022    Plantar fascial fibromatosis of right foot    Other orders  -     methylPREDNISolone (MEDROL DOSEPACK) 4 mg " tablet; use as directed  Dispense: 1 each; Refill: 0        -I gave written and verbal instructions on heel cord stretching and this was demonstrated for the patient. A theraband was also provided to the patient for stretching exercises. Recommendations were given for plantar fasciitis treatment including icing, stretching, arch supports, avoidance of barefoot walking, appropriate shoe wear, and strict compliance. Discussed importance of patient to perform stretching exercises.   -I discussed possible plantar fascial 5 fibromatosis however not distinctly palpable.  I recommend a period of rest in decrease walking.  Patient was treated to be offloaded in a surgical shoe for 2 weeks.  Medrol Dosepak prescribed.  Stretching exercises and icing as instructed.  Patient was provided written instructions.  I will order baseline foot x-ray today for further evaluation.  Follow-up in 3 weeks as scheduled.      Disclaimer:  This note may have been prepared using voice recognition software, it may have not been extensively proofed, as such there could be errors within the text such as sound alike errors.         Future Appointments   Date Time Provider Department Center   8/17/2022  9:15 AM [facility] XR[facility] XRAY [place]   9/7/2022  9:00 AM Shelley Swift DPM [facility] POD [place]   12/12/2022  8:00 AM LAB, SAME DAY [facility] LAB [place]   12/14/2022  8:15 AM Suzi Tolentino MD [facility] URO [place]       Report Electronically Signed By:     Shelley Swift DPM   Podiatry  [facility]  8/17/2022

## 2022-09-07 ENCOUNTER — OFFICE VISIT (OUTPATIENT)
Dept: PODIATRY | Facility: CLINIC | Age: 72
End: 2022-09-07
Payer: MEDICARE

## 2022-09-07 VITALS — BODY MASS INDEX: 30.06 KG/M2 | HEIGHT: 70 IN | WEIGHT: 210 LBS

## 2022-09-07 DIAGNOSIS — M79.671 ARCH PAIN OF RIGHT FOOT: ICD-10-CM

## 2022-09-07 DIAGNOSIS — M72.2 PLANTAR FASCIAL FIBROMATOSIS OF RIGHT FOOT: Primary | ICD-10-CM

## 2022-09-07 DIAGNOSIS — M79.671 RIGHT FOOT PAIN: ICD-10-CM

## 2022-09-07 PROCEDURE — 1125F AMNT PAIN NOTED PAIN PRSNT: CPT | Mod: CPTII,S$GLB,, | Performed by: PODIATRIST

## 2022-09-07 PROCEDURE — 1125F PR PAIN SEVERITY QUANTIFIED, PAIN PRESENT: ICD-10-PCS | Mod: CPTII,S$GLB,, | Performed by: PODIATRIST

## 2022-09-07 PROCEDURE — 3288F PR FALLS RISK ASSESSMENT DOCUMENTED: ICD-10-PCS | Mod: CPTII,S$GLB,, | Performed by: PODIATRIST

## 2022-09-07 PROCEDURE — 99214 OFFICE O/P EST MOD 30 MIN: CPT | Mod: S$GLB,,, | Performed by: PODIATRIST

## 2022-09-07 PROCEDURE — 99999 PR PBB SHADOW E&M-EST. PATIENT-LVL III: ICD-10-PCS | Mod: PBBFAC,,, | Performed by: PODIATRIST

## 2022-09-07 PROCEDURE — 1101F PT FALLS ASSESS-DOCD LE1/YR: CPT | Mod: CPTII,S$GLB,, | Performed by: PODIATRIST

## 2022-09-07 PROCEDURE — 99214 PR OFFICE/OUTPT VISIT, EST, LEVL IV, 30-39 MIN: ICD-10-PCS | Mod: S$GLB,,, | Performed by: PODIATRIST

## 2022-09-07 PROCEDURE — 99999 PR PBB SHADOW E&M-EST. PATIENT-LVL III: CPT | Mod: PBBFAC,,, | Performed by: PODIATRIST

## 2022-09-07 PROCEDURE — 1159F PR MEDICATION LIST DOCUMENTED IN MEDICAL RECORD: ICD-10-PCS | Mod: CPTII,S$GLB,, | Performed by: PODIATRIST

## 2022-09-07 PROCEDURE — 1101F PR PT FALLS ASSESS DOC 0-1 FALLS W/OUT INJ PAST YR: ICD-10-PCS | Mod: CPTII,S$GLB,, | Performed by: PODIATRIST

## 2022-09-07 PROCEDURE — 3288F FALL RISK ASSESSMENT DOCD: CPT | Mod: CPTII,S$GLB,, | Performed by: PODIATRIST

## 2022-09-07 PROCEDURE — 3008F PR BODY MASS INDEX (BMI) DOCUMENTED: ICD-10-PCS | Mod: CPTII,S$GLB,, | Performed by: PODIATRIST

## 2022-09-07 PROCEDURE — 1159F MED LIST DOCD IN RCRD: CPT | Mod: CPTII,S$GLB,, | Performed by: PODIATRIST

## 2022-09-07 PROCEDURE — 4010F PR ACE/ARB THEARPY RXD/TAKEN: ICD-10-PCS | Mod: CPTII,S$GLB,, | Performed by: PODIATRIST

## 2022-09-07 PROCEDURE — 4010F ACE/ARB THERAPY RXD/TAKEN: CPT | Mod: CPTII,S$GLB,, | Performed by: PODIATRIST

## 2022-09-07 PROCEDURE — 3008F BODY MASS INDEX DOCD: CPT | Mod: CPTII,S$GLB,, | Performed by: PODIATRIST

## 2022-09-07 RX ORDER — DICLOFENAC SODIUM 75 MG/1
75 TABLET, DELAYED RELEASE ORAL 2 TIMES DAILY
Qty: 20 TABLET | Refills: 0 | Status: SHIPPED | OUTPATIENT
Start: 2022-09-07 | End: 2022-09-17

## 2022-09-07 NOTE — PROGRESS NOTES
PODIATRIC MEDICINE AND SURGERY  9/7/2022    PCP: Dr. Jose Tran, NP    CHIEF COMPLAINT   Chief Complaint   Patient presents with    Foot Pain     3 week f/u right foot pain, rates pain 2/10, non diabetic wears tennis shoes and socks, Last seen PCP Dr. Tran 07/13/2022       HPI:    Juan C Ferguson is a 72 y.o. male who has no past medical history on file.   Juan C presents to clinic today complaining of  pain to right foot plantar medial arch.  Patient denies any trauma.  Patient states symptoms started approximately 1 month ago in which he noted pain on medial band of plantar fascia.  Pain is 3/10 on pain scale.  He has not had any treatment for symptoms. He is unable to exercise at the length/distance that he is usually able to walk.  He does have antalgic gait.      9/7/2022  Pt is here today for f/u right foot pain. He states pain has resolved greatly. He did have relief with medrol dosepak. Patient denies other pedal complaints at this time.     PMH  History reviewed. No pertinent past medical history.    PROBLEM LIST  Patient Active Problem List    Diagnosis Date Noted    Elevated PSA 12/01/2021       MEDS  Current Outpatient Medications on File Prior to Visit   Medication Sig Dispense Refill    amitriptyline (ELAVIL) 100 MG tablet Take 100 mg by mouth.      atorvastatin (LIPITOR) 20 MG tablet Take 20 mg by mouth.      calcipotriene (DOVONOX) 0.005 % cream Apply topically.      clobetasoL (TEMOVATE) 0.05 % cream Apply 1 application topically 2 (two) times daily.      mesalamine (LIALDA) 1.2 gram TbEC       methylPREDNISolone (MEDROL DOSEPACK) 4 mg tablet use as directed 1 each 0    olmesartan (BENICAR) 20 MG tablet       sildenafiL (VIAGRA) 100 MG tablet Take 100 mg by mouth daily as needed.      zolpidem (AMBIEN CR) 12.5 MG CR tablet Take 12.5 mg by mouth.      [DISCONTINUED] meloxicam (MOBIC) 15 MG tablet Take 15 mg by mouth.      aspirin (ECOTRIN) 81 MG EC tablet Take 1 tablet by mouth once daily.       primidone (MYSOLINE) 50 MG Tab Take 50 mg by mouth.       No current facility-administered medications on file prior to visit.       Medication List with Changes/Refills   New Medications    DICLOFENAC (VOLTAREN) 75 MG EC TABLET    Take 1 tablet (75 mg total) by mouth 2 (two) times daily. for 10 days   Current Medications    AMITRIPTYLINE (ELAVIL) 100 MG TABLET    Take 100 mg by mouth.    ASPIRIN (ECOTRIN) 81 MG EC TABLET    Take 1 tablet by mouth once daily.    ATORVASTATIN (LIPITOR) 20 MG TABLET    Take 20 mg by mouth.    CALCIPOTRIENE (DOVONOX) 0.005 % CREAM    Apply topically.    CLOBETASOL (TEMOVATE) 0.05 % CREAM    Apply 1 application topically 2 (two) times daily.    MESALAMINE (LIALDA) 1.2 GRAM TBEC        METHYLPREDNISOLONE (MEDROL DOSEPACK) 4 MG TABLET    use as directed    OLMESARTAN (BENICAR) 20 MG TABLET        PRIMIDONE (MYSOLINE) 50 MG TAB    Take 50 mg by mouth.    SILDENAFIL (VIAGRA) 100 MG TABLET    Take 100 mg by mouth daily as needed.    ZOLPIDEM (AMBIEN CR) 12.5 MG CR TABLET    Take 12.5 mg by mouth.   Discontinued Medications    MELOXICAM (MOBIC) 15 MG TABLET    Take 15 mg by mouth.       PSH   History reviewed. No pertinent surgical history.     ALL  Review of patient's allergies indicates:   Allergen Reactions    Sulfa (sulfonamide antibiotics) Rash       SOC     Social History     Tobacco Use    Smoking status: Former    Smokeless tobacco: Never   Substance Use Topics    Alcohol use: Yes    Drug use: Never         FAMILY HX  History reviewed. No pertinent family history.       REVIEW OF SYSTEMS  General: This patient is well-developed, well-nourished and appears stated age, well-oriented to person, place and time, and cooperative and pleasant on today's visit  Constitutional: Negative for chills and fever.   Respiratory: Negative for shortness of breath.    Cardiovascular: Negative for chest pain, palpitations, orthopnea  Gastrointestinal: Negative for diarrhea, nausea and vomiting.  "  Musculoskeletal: Positive for above noted in HPI  Skin: Positive for skin and/or nail changes   Neurological: Negative for tingling and sensory changes  Peripheral Vascular: no claudication or cyanosis  Psychiatric/Behavioral: Negative for altered mental status     PHYSICAL EXAM:      Vitals:    09/07/22 0903   Weight: 95.3 kg (210 lb)   Height: 5' 10" (1.778 m)   PainSc:   2         LOWER EXTREMITY PHYSICAL EXAM  VASCULAR  Dorsalis pedis and posterior tibial pulses palpable 2/4 bilaterally. Capillary refill time immediate to the toes. Feet are warm to the touch. Skin temperature warm to warm from proximally to distally There are no varicosities, telangiectasias noted to bilateral foot and ankle regions. There are no ecchymoses noted to bilateral foot and ankle regions. There is no gross lower extremity edema.    DERMATOLOGIC  Skin moist with healthy texture and turgor.There are no open ulcerations, lacerations, or fissures to bilateral foot and ankle regions. There are no signs of infection as there is no erythema, no proximal-extending lymphangiitis, no fluctuance, or crepitus noted on palpation to bilateral foot and ankle regions. There is no interdigital maceration.   There is hyperkeratotic lesions noted plantar hallux    NEUROLOGIC  Epicritic sensation is intact as the patient is able to sense light touch to bilateral foot and ankle regions. Achilles and patellar deep tendon reflexes intact. Babinski reflex absent    ORTHOPEDIC/BIOMECHANICAL  TTP taut medial band of plantar fascia RIGHT foot. Muscle strength AT/EHL/EDL/PT: 5/5; Achilles/Gastroc/Soleus: 5/5; PB/PL: 5/5 Muscle tone is normal.    IMAGING  ordered    ASSESSMENT     Encounter Diagnoses   Name Primary?    Plantar fascial fibromatosis of right foot Yes    Arch pain of right foot     Right foot pain            PLAN  Patient was educated about clinical and imaging findings, and verbalizes understanding of above.     Diagnoses and all orders for this " visit:  Plantar fascial fibromatosis of right foot    Arch pain of right foot    Right foot pain    Other orders  -     diclofenac (VOLTAREN) 75 MG EC tablet; Take 1 tablet (75 mg total) by mouth 2 (two) times daily. for 10 days  Dispense: 20 tablet; Refill: 0    -continue with stretching, NSAIDs PRN  -offloading shoes and/or inserts       Disclaimer:  This note may have been prepared using voice recognition software, it may have not been extensively proofed, as such there could be errors within the text such as sound alike errors.         Future Appointments   Date Time Provider Department Center   12/12/2022  8:00 AM LAB, SAME DAY [facility] LAB [facility] Infante   12/14/2022  8:15 AM Suzi Tolentino MD [facility] Infante       Report Electronically Signed By:     Shelley Swift DPM   Podiatry  [facility]  9/7/2022

## 2022-12-06 ENCOUNTER — PATIENT MESSAGE (OUTPATIENT)
Dept: RESEARCH | Facility: HOSPITAL | Age: 72
End: 2022-12-06
Payer: MEDICARE

## 2022-12-12 ENCOUNTER — LAB VISIT (OUTPATIENT)
Dept: LAB | Facility: HOSPITAL | Age: 72
End: 2022-12-12
Attending: UROLOGY
Payer: MEDICARE

## 2022-12-12 DIAGNOSIS — R97.20 ELEVATED PSA: ICD-10-CM

## 2022-12-12 LAB — COMPLEXED PSA SERPL-MCNC: 5.2 NG/ML (ref 0–4)

## 2022-12-12 PROCEDURE — 84153 ASSAY OF PSA TOTAL: CPT | Performed by: UROLOGY

## 2022-12-12 PROCEDURE — 36415 COLL VENOUS BLD VENIPUNCTURE: CPT | Mod: PO | Performed by: UROLOGY

## 2022-12-14 ENCOUNTER — TELEPHONE (OUTPATIENT)
Dept: UROLOGY | Facility: CLINIC | Age: 72
End: 2022-12-14
Payer: MEDICARE

## 2022-12-14 ENCOUNTER — OFFICE VISIT (OUTPATIENT)
Dept: UROLOGY | Facility: CLINIC | Age: 72
End: 2022-12-14
Payer: MEDICARE

## 2022-12-14 VITALS
BODY MASS INDEX: 30.36 KG/M2 | SYSTOLIC BLOOD PRESSURE: 141 MMHG | DIASTOLIC BLOOD PRESSURE: 77 MMHG | HEIGHT: 70 IN | WEIGHT: 212.06 LBS | HEART RATE: 67 BPM

## 2022-12-14 DIAGNOSIS — N13.8 BPH WITH OBSTRUCTION/LOWER URINARY TRACT SYMPTOMS: ICD-10-CM

## 2022-12-14 DIAGNOSIS — R31.29 MICROHEMATURIA: Primary | ICD-10-CM

## 2022-12-14 DIAGNOSIS — R97.20 ELEVATED PSA: ICD-10-CM

## 2022-12-14 DIAGNOSIS — N40.1 BPH WITH OBSTRUCTION/LOWER URINARY TRACT SYMPTOMS: ICD-10-CM

## 2022-12-14 DIAGNOSIS — N52.9 ERECTILE DYSFUNCTION, UNSPECIFIED ERECTILE DYSFUNCTION TYPE: ICD-10-CM

## 2022-12-14 LAB
BILIRUB SERPL-MCNC: NORMAL MG/DL
BLOOD URINE, POC: NORMAL
CLARITY, POC UA: CLEAR
COLOR, POC UA: YELLOW
GLUCOSE UR QL STRIP: NORMAL
KETONES UR QL STRIP: NORMAL
LEUKOCYTE ESTERASE URINE, POC: NORMAL
NITRITE, POC UA: NORMAL
PH, POC UA: 8
PROTEIN, POC: NORMAL
SPECIFIC GRAVITY, POC UA: 1.01
UROBILINOGEN, POC UA: NORMAL

## 2022-12-14 PROCEDURE — 99214 PR OFFICE/OUTPT VISIT, EST, LEVL IV, 30-39 MIN: ICD-10-PCS | Mod: S$GLB,,, | Performed by: UROLOGY

## 2022-12-14 PROCEDURE — 99999 PR PBB SHADOW E&M-EST. PATIENT-LVL III: CPT | Mod: PBBFAC,,, | Performed by: UROLOGY

## 2022-12-14 PROCEDURE — 81002 URINALYSIS NONAUTO W/O SCOPE: CPT | Mod: S$GLB,,, | Performed by: UROLOGY

## 2022-12-14 PROCEDURE — 3008F PR BODY MASS INDEX (BMI) DOCUMENTED: ICD-10-PCS | Mod: CPTII,S$GLB,, | Performed by: UROLOGY

## 2022-12-14 PROCEDURE — 3078F PR MOST RECENT DIASTOLIC BLOOD PRESSURE < 80 MM HG: ICD-10-PCS | Mod: CPTII,S$GLB,, | Performed by: UROLOGY

## 2022-12-14 PROCEDURE — 4010F ACE/ARB THERAPY RXD/TAKEN: CPT | Mod: CPTII,S$GLB,, | Performed by: UROLOGY

## 2022-12-14 PROCEDURE — 3077F SYST BP >= 140 MM HG: CPT | Mod: CPTII,S$GLB,, | Performed by: UROLOGY

## 2022-12-14 PROCEDURE — 1159F PR MEDICATION LIST DOCUMENTED IN MEDICAL RECORD: ICD-10-PCS | Mod: CPTII,S$GLB,, | Performed by: UROLOGY

## 2022-12-14 PROCEDURE — 99999 PR PBB SHADOW E&M-EST. PATIENT-LVL III: ICD-10-PCS | Mod: PBBFAC,,, | Performed by: UROLOGY

## 2022-12-14 PROCEDURE — 4010F PR ACE/ARB THEARPY RXD/TAKEN: ICD-10-PCS | Mod: CPTII,S$GLB,, | Performed by: UROLOGY

## 2022-12-14 PROCEDURE — 3078F DIAST BP <80 MM HG: CPT | Mod: CPTII,S$GLB,, | Performed by: UROLOGY

## 2022-12-14 PROCEDURE — 3008F BODY MASS INDEX DOCD: CPT | Mod: CPTII,S$GLB,, | Performed by: UROLOGY

## 2022-12-14 PROCEDURE — 99214 OFFICE O/P EST MOD 30 MIN: CPT | Mod: S$GLB,,, | Performed by: UROLOGY

## 2022-12-14 PROCEDURE — 1159F MED LIST DOCD IN RCRD: CPT | Mod: CPTII,S$GLB,, | Performed by: UROLOGY

## 2022-12-14 PROCEDURE — 1126F AMNT PAIN NOTED NONE PRSNT: CPT | Mod: CPTII,S$GLB,, | Performed by: UROLOGY

## 2022-12-14 PROCEDURE — 81002 POCT URINE DIPSTICK WITHOUT MICROSCOPE: ICD-10-PCS | Mod: S$GLB,,, | Performed by: UROLOGY

## 2022-12-14 PROCEDURE — 1126F PR PAIN SEVERITY QUANTIFIED, NO PAIN PRESENT: ICD-10-PCS | Mod: CPTII,S$GLB,, | Performed by: UROLOGY

## 2022-12-14 PROCEDURE — 3077F PR MOST RECENT SYSTOLIC BLOOD PRESSURE >= 140 MM HG: ICD-10-PCS | Mod: CPTII,S$GLB,, | Performed by: UROLOGY

## 2022-12-14 NOTE — TELEPHONE ENCOUNTER
----- Message from Cora Mcclelland, RT sent at 12/14/2022 10:53 AM CST -----  Regarding: pt needs a STAT creatinine and lab appointment booked  Mr Ferguson is on the schedule for Friday for his CT at 11:00a.   He needs to get a STAT creatinine drawn before the CT can be done.  The [place] location does not do same day STAT labs.  If labs are not done exam can not be done.     Thanks Cora Espinosa    Please call with any questions [phone]

## 2022-12-14 NOTE — PROGRESS NOTES
CC: follow up elevated PSA    History of Present Illness:     12/14/22-stream is good. Nocturia x 0-1. No gross hematuria. No urge incontinence, but mild urgency. Uses Viagra prn  6/30/22-Stream is okay. No gross hematuria. He does report some urgency, but manageable. Nocturia x 1. Libido is low. Viagra doesn't work well.   11/30/21-good stream. No gross hematuria. No dysuria. No stranguria.   5/28/21-Pt is a 71yo male here for follow up of elevated PSA. No nocturia. Stream is okay. No gross hematuria or dysuria.     Past  history:   TRUS biopsy 5/2019 PSA of 4.3, benign  MRI PI-RADS 4 12/2019, 67cc prostate  MR/US fusion biopsy 2/2020, benign, PSA 6.23  MRI 1/2021 with PI RADS 4 lesion 7mm right peripheral zone apex, stable compared to prior MRI--elected to defer repeat biopsy, PSA decreased    Review of Systems   Constitutional:  Negative for chills and fever.   Gastrointestinal:  Negative for abdominal pain.   Genitourinary:  Negative for difficulty urinating and hematuria.   Neurological:  Negative for numbness and headaches.   All other systems reviewed and are negative.    Current Outpatient Medications   Medication Sig Dispense Refill    amitriptyline (ELAVIL) 100 MG tablet Take 100 mg by mouth.      aspirin (ECOTRIN) 81 MG EC tablet Take 1 tablet by mouth once daily.      atorvastatin (LIPITOR) 20 MG tablet Take 20 mg by mouth.      calcipotriene (DOVONOX) 0.005 % cream Apply topically.      clobetasoL (TEMOVATE) 0.05 % cream Apply 1 application topically 2 (two) times daily.      mesalamine (LIALDA) 1.2 gram TbEC       methylPREDNISolone (MEDROL DOSEPACK) 4 mg tablet use as directed 1 each 0    olmesartan (BENICAR) 20 MG tablet       primidone (MYSOLINE) 50 MG Tab Take 50 mg by mouth.      sildenafiL (VIAGRA) 100 MG tablet Take 100 mg by mouth daily as needed.      zolpidem (AMBIEN CR) 12.5 MG CR tablet Take 12.5 mg by mouth.       No current facility-administered medications for this visit.        Review of patient's allergies indicates:   Allergen Reactions    Sulfa (sulfonamide antibiotics) Rash       Past medical, family, and social history reviewed as documented in chart with pertinent positive medical, family, and social history detailed in HPI.    Physical Exam  Vitals:    12/14/22 0832   BP: (!) 141/77   Pulse: 67         General: Well-developed, well-nourished, in no acute distress  HEENT: Normocephalic, atraumatic, extraocular eye movements in tact  Neck: supple, trachea midline, no cervical or supraclavicular adenopathy  Respirations: Even and unlabored  Rectal: 6/30/22->40gram prostate without nodules or tenderness. No gross blood.   Extremities: Moves all extremities equally, atraumatic, no clubbing, cyanosis, edema  Skin: warm and dry, no lesions  Psych: normal affect  Neuro: Alert and oriented x 3. Cranial nerves II-XII grossly intact    Urinalysis  Trace blood, otherwise negative    PSA    12/22/20: 11.0  5/28/21: 4.4  11/30/21: 4.3  6/30/22: 6.5  12/12/22: 5.2    Assessment:   1. Microhematuria  BUN    Creatinine, Serum    CT Urogram Abd Pelvis W WO      2. Elevated PSA  Prostate Specific Antigen, Diagnostic      3. BPH with obstruction/lower urinary tract symptoms        4. Erectile dysfunction, unspecified erectile dysfunction type                Plan:  Microhematuria  -     BUN; Future; Expected date: 12/14/2022  -     Creatinine, Serum; Future; Expected date: 12/14/2022  -     CT Urogram Abd Pelvis W WO; Future; Expected date: 12/14/2022    Elevated PSA  -     Prostate Specific Antigen, Diagnostic; Future; Expected date: 12/14/2022    BPH with obstruction/lower urinary tract symptoms    Erectile dysfunction, unspecified erectile dysfunction type    Continue viagra prn  Discussed microhematuria workup. Pt agrees to CT scan.   Pt prefers to hold off on cysto  Follow up in about 6 months (around 6/14/2023).

## 2023-01-05 ENCOUNTER — LAB VISIT (OUTPATIENT)
Dept: LAB | Facility: HOSPITAL | Age: 73
End: 2023-01-05
Attending: UROLOGY
Payer: MEDICARE

## 2023-01-05 DIAGNOSIS — R31.29 MICROHEMATURIA: ICD-10-CM

## 2023-01-05 DIAGNOSIS — R97.20 ELEVATED PSA: ICD-10-CM

## 2023-01-05 LAB
BUN SERPL-MCNC: 18 MG/DL (ref 8–23)
CREAT SERPL-MCNC: 0.9 MG/DL (ref 0.5–1.4)
EST. GFR  (NO RACE VARIABLE): >60 ML/MIN/1.73 M^2

## 2023-01-05 PROCEDURE — 84153 ASSAY OF PSA TOTAL: CPT | Performed by: UROLOGY

## 2023-01-05 PROCEDURE — 82565 ASSAY OF CREATININE: CPT | Performed by: UROLOGY

## 2023-01-05 PROCEDURE — 36415 COLL VENOUS BLD VENIPUNCTURE: CPT | Mod: PN | Performed by: UROLOGY

## 2023-01-05 PROCEDURE — 84520 ASSAY OF UREA NITROGEN: CPT | Performed by: UROLOGY

## 2023-01-06 ENCOUNTER — HOSPITAL ENCOUNTER (OUTPATIENT)
Dept: RADIOLOGY | Facility: HOSPITAL | Age: 73
Discharge: HOME | End: 2023-01-06
Attending: UROLOGY
Payer: MEDICARE

## 2023-01-06 DIAGNOSIS — R31.29 MICROHEMATURIA: ICD-10-CM

## 2023-01-06 LAB — COMPLEXED PSA SERPL-MCNC: 5.4 NG/ML (ref 0–4)

## 2023-01-06 PROCEDURE — 74178 CT ABD&PLV WO CNTR FLWD CNTR: CPT | Mod: TC,PN

## 2023-01-06 PROCEDURE — 74178 CT UROGRAM ABD PELVIS W WO: ICD-10-PCS | Mod: 26,,, | Performed by: RADIOLOGY

## 2023-01-06 PROCEDURE — 25500020 PHARM REV CODE 255: Mod: PN | Performed by: UROLOGY

## 2023-01-06 PROCEDURE — 74178 CT ABD&PLV WO CNTR FLWD CNTR: CPT | Mod: 26,,, | Performed by: RADIOLOGY

## 2023-01-06 RX ADMIN — IOHEXOL 100 ML: 350 INJECTION, SOLUTION INTRAVENOUS at 11:01

## 2023-01-12 ENCOUNTER — PATIENT MESSAGE (OUTPATIENT)
Dept: UROLOGY | Facility: CLINIC | Age: 73
End: 2023-01-12
Payer: MEDICARE

## 2023-01-19 ENCOUNTER — TELEPHONE (OUTPATIENT)
Dept: UROLOGY | Facility: CLINIC | Age: 73
End: 2023-01-19
Payer: MEDICARE

## 2023-01-19 NOTE — TELEPHONE ENCOUNTER
Tried to call pt about results and scheduling there was no answer left pt a voicemail.    ----- Message from Suzi Tolentino MD sent at 1/12/2023  3:11 PM CST -----  Please contact patient and notify him that I am concerned about an area in his bladder on the CT scan and I would like to schedule cysto. He was previously not interested  in this, but see if he is willing to schedule now.

## 2023-06-07 ENCOUNTER — LAB VISIT (OUTPATIENT)
Dept: LAB | Facility: HOSPITAL | Age: 73
End: 2023-06-07
Attending: UROLOGY
Payer: MEDICARE

## 2023-06-07 DIAGNOSIS — R97.20 ELEVATED PSA: ICD-10-CM

## 2023-06-07 LAB — COMPLEXED PSA SERPL-MCNC: 6.1 NG/ML (ref 0–4)

## 2023-06-07 PROCEDURE — 84153 ASSAY OF PSA TOTAL: CPT | Performed by: UROLOGY

## 2023-06-07 PROCEDURE — 36415 COLL VENOUS BLD VENIPUNCTURE: CPT | Mod: PN | Performed by: UROLOGY

## 2023-06-14 ENCOUNTER — OFFICE VISIT (OUTPATIENT)
Dept: UROLOGY | Facility: CLINIC | Age: 73
End: 2023-06-14
Payer: MEDICARE

## 2023-06-14 VITALS
RESPIRATION RATE: 18 BRPM | HEIGHT: 70 IN | BODY MASS INDEX: 28.44 KG/M2 | TEMPERATURE: 98 F | DIASTOLIC BLOOD PRESSURE: 81 MMHG | HEART RATE: 70 BPM | WEIGHT: 198.63 LBS | SYSTOLIC BLOOD PRESSURE: 125 MMHG

## 2023-06-14 DIAGNOSIS — R97.20 ELEVATED PSA: Primary | ICD-10-CM

## 2023-06-14 DIAGNOSIS — N40.1 BPH WITH OBSTRUCTION/LOWER URINARY TRACT SYMPTOMS: ICD-10-CM

## 2023-06-14 DIAGNOSIS — N13.8 BPH WITH OBSTRUCTION/LOWER URINARY TRACT SYMPTOMS: ICD-10-CM

## 2023-06-14 DIAGNOSIS — R31.29 MICROHEMATURIA: ICD-10-CM

## 2023-06-14 PROCEDURE — 3008F PR BODY MASS INDEX (BMI) DOCUMENTED: ICD-10-PCS | Mod: CPTII,S$GLB,, | Performed by: UROLOGY

## 2023-06-14 PROCEDURE — 99213 PR OFFICE/OUTPT VISIT, EST, LEVL III, 20-29 MIN: ICD-10-PCS | Mod: S$GLB,,, | Performed by: UROLOGY

## 2023-06-14 PROCEDURE — 3288F PR FALLS RISK ASSESSMENT DOCUMENTED: ICD-10-PCS | Mod: CPTII,S$GLB,, | Performed by: UROLOGY

## 2023-06-14 PROCEDURE — 1101F PT FALLS ASSESS-DOCD LE1/YR: CPT | Mod: CPTII,S$GLB,, | Performed by: UROLOGY

## 2023-06-14 PROCEDURE — 3288F FALL RISK ASSESSMENT DOCD: CPT | Mod: CPTII,S$GLB,, | Performed by: UROLOGY

## 2023-06-14 PROCEDURE — 4010F ACE/ARB THERAPY RXD/TAKEN: CPT | Mod: CPTII,S$GLB,, | Performed by: UROLOGY

## 2023-06-14 PROCEDURE — 1126F PR PAIN SEVERITY QUANTIFIED, NO PAIN PRESENT: ICD-10-PCS | Mod: CPTII,S$GLB,, | Performed by: UROLOGY

## 2023-06-14 PROCEDURE — 3079F DIAST BP 80-89 MM HG: CPT | Mod: CPTII,S$GLB,, | Performed by: UROLOGY

## 2023-06-14 PROCEDURE — 1126F AMNT PAIN NOTED NONE PRSNT: CPT | Mod: CPTII,S$GLB,, | Performed by: UROLOGY

## 2023-06-14 PROCEDURE — 1160F RVW MEDS BY RX/DR IN RCRD: CPT | Mod: CPTII,S$GLB,, | Performed by: UROLOGY

## 2023-06-14 PROCEDURE — 99999 PR PBB SHADOW E&M-EST. PATIENT-LVL III: ICD-10-PCS | Mod: PBBFAC,,, | Performed by: UROLOGY

## 2023-06-14 PROCEDURE — 1159F PR MEDICATION LIST DOCUMENTED IN MEDICAL RECORD: ICD-10-PCS | Mod: CPTII,S$GLB,, | Performed by: UROLOGY

## 2023-06-14 PROCEDURE — 99213 OFFICE O/P EST LOW 20 MIN: CPT | Mod: S$GLB,,, | Performed by: UROLOGY

## 2023-06-14 PROCEDURE — 4010F PR ACE/ARB THEARPY RXD/TAKEN: ICD-10-PCS | Mod: CPTII,S$GLB,, | Performed by: UROLOGY

## 2023-06-14 PROCEDURE — 3074F SYST BP LT 130 MM HG: CPT | Mod: CPTII,S$GLB,, | Performed by: UROLOGY

## 2023-06-14 PROCEDURE — 99999 PR PBB SHADOW E&M-EST. PATIENT-LVL III: CPT | Mod: PBBFAC,,, | Performed by: UROLOGY

## 2023-06-14 PROCEDURE — 3008F BODY MASS INDEX DOCD: CPT | Mod: CPTII,S$GLB,, | Performed by: UROLOGY

## 2023-06-14 PROCEDURE — 3074F PR MOST RECENT SYSTOLIC BLOOD PRESSURE < 130 MM HG: ICD-10-PCS | Mod: CPTII,S$GLB,, | Performed by: UROLOGY

## 2023-06-14 PROCEDURE — 1159F MED LIST DOCD IN RCRD: CPT | Mod: CPTII,S$GLB,, | Performed by: UROLOGY

## 2023-06-14 PROCEDURE — 1160F PR REVIEW ALL MEDS BY PRESCRIBER/CLIN PHARMACIST DOCUMENTED: ICD-10-PCS | Mod: CPTII,S$GLB,, | Performed by: UROLOGY

## 2023-06-14 PROCEDURE — 1101F PR PT FALLS ASSESS DOC 0-1 FALLS W/OUT INJ PAST YR: ICD-10-PCS | Mod: CPTII,S$GLB,, | Performed by: UROLOGY

## 2023-06-14 PROCEDURE — 3079F PR MOST RECENT DIASTOLIC BLOOD PRESSURE 80-89 MM HG: ICD-10-PCS | Mod: CPTII,S$GLB,, | Performed by: UROLOGY

## 2023-06-14 NOTE — PROGRESS NOTES
CC: follow up elevated PSA    History of Present Illness:     6/14/23- stream is okay. No gross hematuria. Sometimes urgency, but no UUI. Pt started UC treatment 2 months ago. CT scan completed 1/2023 shows a thickened bladder wall and heterogenous prostate. Pt never underwent cysto.   12/14/22-stream is good. Nocturia x 0-1. No gross hematuria. No urge incontinence, but mild urgency. Uses Viagra prn  6/30/22-Stream is okay. No gross hematuria. He does report some urgency, but manageable. Nocturia x 1. Libido is low. Viagra doesn't work well.   11/30/21-good stream. No gross hematuria. No dysuria. No stranguria.   5/28/21-Pt is a 69yo male here for follow up of elevated PSA. No nocturia. Stream is okay. No gross hematuria or dysuria.     Past  history:   TRUS biopsy 5/2019 PSA of 4.3, benign  MRI PI-RADS 4 12/2019, 67cc prostate  MR/US fusion biopsy 2/2020, benign, PSA 6.23  MRI 1/2021 with PI RADS 4 lesion 7mm right peripheral zone apex, stable compared to prior MRI--elected to defer repeat biopsy, PSA decreased    Review of Systems   Constitutional:  Negative for chills and fever.   Gastrointestinal:  Negative for abdominal pain.   Genitourinary:  Negative for difficulty urinating and hematuria.   Neurological:  Negative for numbness and headaches.   All other systems reviewed and are negative.    Current Outpatient Medications   Medication Sig Dispense Refill    amitriptyline (ELAVIL) 100 MG tablet Take 100 mg by mouth.      atorvastatin (LIPITOR) 20 MG tablet Take 20 mg by mouth.      calcipotriene (DOVONOX) 0.005 % cream Apply topically.      clobetasoL (TEMOVATE) 0.05 % cream Apply 1 application topically 2 (two) times daily.      mesalamine (LIALDA) 1.2 gram TbEC       methylPREDNISolone (MEDROL DOSEPACK) 4 mg tablet use as directed 1 each 0    olmesartan (BENICAR) 20 MG tablet       sildenafiL (VIAGRA) 100 MG tablet Take 100 mg by mouth daily as needed.      zolpidem (AMBIEN CR) 12.5 MG CR tablet Take  12.5 mg by mouth.      aspirin (ECOTRIN) 81 MG EC tablet Take 1 tablet by mouth once daily.      primidone (MYSOLINE) 50 MG Tab Take 50 mg by mouth.       No current facility-administered medications for this visit.       Review of patient's allergies indicates:   Allergen Reactions    Sulfa (sulfonamide antibiotics) Rash       Past medical, family, and social history reviewed as documented in chart with pertinent positive medical, family, and social history detailed in HPI.    Physical Exam  Vitals:    06/14/23 0951   BP: 125/81   Pulse: 70   Resp: 18   Temp: 97.7 °F (36.5 °C)         General: Well-developed, well-nourished, in no acute distress  HEENT: Normocephalic, atraumatic, extraocular eye movements in tact  Neck: supple, trachea midline, no cervical or supraclavicular adenopathy  Respirations: Even and unlabored  Rectal: 6/14/23->40gram prostate without nodules or tenderness. No gross blood.   Extremities: Moves all extremities equally, atraumatic, no clubbing, cyanosis, edema  Skin: warm and dry, no lesions  Psych: normal affect  Neuro: Alert and oriented x 3. Cranial nerves II-XII grossly intact      PSA    12/22/20: 11.0  5/28/21: 4.4  11/30/21: 4.3  6/30/22: 6.5  12/12/22: 5.2  6/7/23: 6.1    Assessment:   1. Elevated PSA  Prostate Specific Antigen, Diagnostic      2. Microhematuria        3. BPH with obstruction/lower urinary tract symptoms                  Plan:  Elevated PSA  -     Prostate Specific Antigen, Diagnostic; Future; Expected date: 12/14/2023    Microhematuria    BPH with obstruction/lower urinary tract symptoms    PSA remains relatively stable. Will continue to monitor.   Pt elects against cystoscopy at this point.   Follow up in about 6 months (around 12/14/2023).

## 2023-07-03 ENCOUNTER — PATIENT MESSAGE (OUTPATIENT)
Dept: UROLOGY | Facility: CLINIC | Age: 73
End: 2023-07-03
Payer: MEDICARE

## 2023-07-07 ENCOUNTER — LAB VISIT (OUTPATIENT)
Dept: LAB | Facility: HOSPITAL | Age: 73
End: 2023-07-07
Attending: UROLOGY
Payer: MEDICARE

## 2023-07-07 DIAGNOSIS — R97.20 ELEVATED PSA: ICD-10-CM

## 2023-07-07 PROCEDURE — 84153 ASSAY OF PSA TOTAL: CPT | Performed by: UROLOGY

## 2023-07-07 PROCEDURE — 36415 COLL VENOUS BLD VENIPUNCTURE: CPT | Mod: PN | Performed by: UROLOGY

## 2023-07-08 LAB — COMPLEXED PSA SERPL-MCNC: 6 NG/ML (ref 0–4)

## 2023-12-15 ENCOUNTER — LAB VISIT (OUTPATIENT)
Dept: LAB | Facility: HOSPITAL | Age: 73
End: 2023-12-15
Attending: UROLOGY
Payer: MEDICARE

## 2023-12-15 ENCOUNTER — OFFICE VISIT (OUTPATIENT)
Dept: UROLOGY | Facility: CLINIC | Age: 73
End: 2023-12-15
Payer: MEDICARE

## 2023-12-15 VITALS
SYSTOLIC BLOOD PRESSURE: 167 MMHG | RESPIRATION RATE: 16 BRPM | HEART RATE: 60 BPM | WEIGHT: 195.56 LBS | BODY MASS INDEX: 28 KG/M2 | HEIGHT: 70 IN | DIASTOLIC BLOOD PRESSURE: 83 MMHG

## 2023-12-15 DIAGNOSIS — R31.29 MICROHEMATURIA: ICD-10-CM

## 2023-12-15 DIAGNOSIS — R97.20 ELEVATED PSA: ICD-10-CM

## 2023-12-15 DIAGNOSIS — R97.20 ELEVATED PSA: Primary | ICD-10-CM

## 2023-12-15 LAB
BILIRUB UR QL STRIP: NEGATIVE
COMPLEXED PSA SERPL-MCNC: 7.2 NG/ML (ref 0–4)
GLUCOSE UR QL STRIP: NEGATIVE
KETONES UR QL STRIP: NEGATIVE
LEUKOCYTE ESTERASE UR QL STRIP: NEGATIVE
PH, POC UA: 6.5
POC BLOOD, URINE: POSITIVE
POC NITRATES, URINE: NEGATIVE
PROT UR QL STRIP: NEGATIVE
SP GR UR STRIP: 1.02 (ref 1–1.03)
UROBILINOGEN UR STRIP-ACNC: 0.2 (ref 0.3–2.2)

## 2023-12-15 PROCEDURE — 99999 PR PBB SHADOW E&M-EST. PATIENT-LVL IV: ICD-10-PCS | Mod: PBBFAC,,, | Performed by: UROLOGY

## 2023-12-15 PROCEDURE — 3077F SYST BP >= 140 MM HG: CPT | Mod: CPTII,S$GLB,, | Performed by: UROLOGY

## 2023-12-15 PROCEDURE — 3079F PR MOST RECENT DIASTOLIC BLOOD PRESSURE 80-89 MM HG: ICD-10-PCS | Mod: CPTII,S$GLB,, | Performed by: UROLOGY

## 2023-12-15 PROCEDURE — 99999 PR PBB SHADOW E&M-EST. PATIENT-LVL IV: CPT | Mod: PBBFAC,,, | Performed by: UROLOGY

## 2023-12-15 PROCEDURE — 1101F PR PT FALLS ASSESS DOC 0-1 FALLS W/OUT INJ PAST YR: ICD-10-PCS | Mod: CPTII,S$GLB,, | Performed by: UROLOGY

## 2023-12-15 PROCEDURE — 3008F BODY MASS INDEX DOCD: CPT | Mod: CPTII,S$GLB,, | Performed by: UROLOGY

## 2023-12-15 PROCEDURE — 3079F DIAST BP 80-89 MM HG: CPT | Mod: CPTII,S$GLB,, | Performed by: UROLOGY

## 2023-12-15 PROCEDURE — 1159F MED LIST DOCD IN RCRD: CPT | Mod: CPTII,S$GLB,, | Performed by: UROLOGY

## 2023-12-15 PROCEDURE — 99213 PR OFFICE/OUTPT VISIT, EST, LEVL III, 20-29 MIN: ICD-10-PCS | Mod: S$GLB,,, | Performed by: UROLOGY

## 2023-12-15 PROCEDURE — 1159F PR MEDICATION LIST DOCUMENTED IN MEDICAL RECORD: ICD-10-PCS | Mod: CPTII,S$GLB,, | Performed by: UROLOGY

## 2023-12-15 PROCEDURE — 4010F ACE/ARB THERAPY RXD/TAKEN: CPT | Mod: CPTII,S$GLB,, | Performed by: UROLOGY

## 2023-12-15 PROCEDURE — 3077F PR MOST RECENT SYSTOLIC BLOOD PRESSURE >= 140 MM HG: ICD-10-PCS | Mod: CPTII,S$GLB,, | Performed by: UROLOGY

## 2023-12-15 PROCEDURE — 3008F PR BODY MASS INDEX (BMI) DOCUMENTED: ICD-10-PCS | Mod: CPTII,S$GLB,, | Performed by: UROLOGY

## 2023-12-15 PROCEDURE — 1101F PT FALLS ASSESS-DOCD LE1/YR: CPT | Mod: CPTII,S$GLB,, | Performed by: UROLOGY

## 2023-12-15 PROCEDURE — 3288F PR FALLS RISK ASSESSMENT DOCUMENTED: ICD-10-PCS | Mod: CPTII,S$GLB,, | Performed by: UROLOGY

## 2023-12-15 PROCEDURE — 99213 OFFICE O/P EST LOW 20 MIN: CPT | Mod: S$GLB,,, | Performed by: UROLOGY

## 2023-12-15 PROCEDURE — 81003 POCT URINALYSIS, DIPSTICK, AUTOMATED, W/O SCOPE: ICD-10-PCS | Mod: QW,S$GLB,, | Performed by: UROLOGY

## 2023-12-15 PROCEDURE — 4010F PR ACE/ARB THEARPY RXD/TAKEN: ICD-10-PCS | Mod: CPTII,S$GLB,, | Performed by: UROLOGY

## 2023-12-15 PROCEDURE — 1126F AMNT PAIN NOTED NONE PRSNT: CPT | Mod: CPTII,S$GLB,, | Performed by: UROLOGY

## 2023-12-15 PROCEDURE — 36415 COLL VENOUS BLD VENIPUNCTURE: CPT | Mod: PN | Performed by: UROLOGY

## 2023-12-15 PROCEDURE — 3288F FALL RISK ASSESSMENT DOCD: CPT | Mod: CPTII,S$GLB,, | Performed by: UROLOGY

## 2023-12-15 PROCEDURE — 84153 ASSAY OF PSA TOTAL: CPT | Performed by: UROLOGY

## 2023-12-15 PROCEDURE — 1126F PR PAIN SEVERITY QUANTIFIED, NO PAIN PRESENT: ICD-10-PCS | Mod: CPTII,S$GLB,, | Performed by: UROLOGY

## 2023-12-15 PROCEDURE — 81003 URINALYSIS AUTO W/O SCOPE: CPT | Mod: QW,S$GLB,, | Performed by: UROLOGY

## 2023-12-15 NOTE — PROGRESS NOTES
CC: follow up elevated PSA    History of Present Illness:     12/15/23-Stream is okay. No gross hematuria or dysuria. No incontinence.   6/14/23- stream is okay. No gross hematuria. Sometimes urgency, but no UUI. Pt started UC treatment 2 months ago. CT scan completed 1/2023 shows a thickened bladder wall and heterogenous prostate. Pt never underwent cysto.   12/14/22-stream is good. Nocturia x 0-1. No gross hematuria. No urge incontinence, but mild urgency. Uses Viagra prn  6/30/22-Stream is okay. No gross hematuria. He does report some urgency, but manageable. Nocturia x 1. Libido is low. Viagra doesn't work well.   11/30/21-good stream. No gross hematuria. No dysuria. No stranguria.   5/28/21-Pt is a 71yo male here for follow up of elevated PSA. No nocturia. Stream is okay. No gross hematuria or dysuria.     Past  history:   TRUS biopsy 5/2019 PSA of 4.3, benign  MRI PI-RADS 4 12/2019, 67cc prostate  MR/US fusion biopsy 2/2020, benign, PSA 6.23  MRI 1/2021 with PI RADS 4 lesion 7mm right peripheral zone apex, stable compared to prior MRI--elected to defer repeat biopsy, PSA decreased    Review of Systems   Constitutional:  Negative for chills and fever.   Gastrointestinal:  Negative for abdominal pain.   Genitourinary:  Negative for difficulty urinating and hematuria.   Neurological:  Negative for numbness and headaches.   All other systems reviewed and are negative.      Current Outpatient Medications   Medication Sig Dispense Refill    amitriptyline (ELAVIL) 100 MG tablet Take 100 mg by mouth.      atorvastatin (LIPITOR) 20 MG tablet Take 20 mg by mouth.      calcipotriene (DOVONOX) 0.005 % cream Apply topically.      clobetasoL (TEMOVATE) 0.05 % cream Apply 1 application topically 2 (two) times daily.      mesalamine (LIALDA) 1.2 gram TbEC       methylPREDNISolone (MEDROL DOSEPACK) 4 mg tablet use as directed 1 each 0    olmesartan (BENICAR) 20 MG tablet       sildenafiL (VIAGRA) 100 MG tablet Take 100 mg  by mouth daily as needed.      zolpidem (AMBIEN CR) 12.5 MG CR tablet Take 12.5 mg by mouth.      aspirin (ECOTRIN) 81 MG EC tablet Take 1 tablet by mouth once daily.      primidone (MYSOLINE) 50 MG Tab Take 50 mg by mouth.       No current facility-administered medications for this visit.       Review of patient's allergies indicates:   Allergen Reactions    Sulfa (sulfonamide antibiotics) Rash       Past medical, family, and social history reviewed as documented in chart with pertinent positive medical, family, and social history detailed in HPI.    Physical Exam  Vitals:    12/15/23 1104   BP: (!) 167/83   Pulse: 60   Resp: 16         General: Well-developed, well-nourished, in no acute distress  HEENT: Normocephalic, atraumatic, extraocular eye movements in tact  Neck: supple, trachea midline, no cervical or supraclavicular adenopathy  Respirations: Even and unlabored  Rectal: 6/14/23->40gram prostate without nodules or tenderness. No gross blood.   Extremities: Moves all extremities equally, atraumatic, no clubbing, cyanosis, edema  Skin: warm and dry, no lesions  Psych: normal affect  Neuro: Alert and oriented x 3. Cranial nerves II-XII grossly intact      PSA    12/22/20: 11.0  5/28/21: 4.4  11/30/21: 4.3  6/30/22: 6.5  12/12/22: 5.2  6/7/23: 6.1  7/7/23: 6.0  12/15/23- 7.2      Assessment:   1. Elevated PSA  Prostate Specific Antigen, Diagnostic    POCT Urinalysis, Dipstick, Automated, W/O Scope    BUN    Creatinine, Serum    MRI Prostate W W/O Contrast      2. Microhematuria                    Plan:  Elevated PSA  -     Prostate Specific Antigen, Diagnostic; Future; Expected date: 12/15/2023  -     POCT Urinalysis, Dipstick, Automated, W/O Scope  -     BUN; Future; Expected date: 12/19/2023  -     Creatinine, Serum; Future; Expected date: 12/19/2023  -     MRI Prostate W W/O Contrast; Future; Expected date: 12/19/2023    Microhematuria    Pt doesn't want to undergo hematuria workup.   Called pt following  visit to discuss PSA. No answer. Voicemail left.   Sent message on [employer] regarding rising PSA and my recommendation for MRI.   Follow up in about 6 months (around 6/15/2024).

## 2023-12-15 NOTE — Clinical Note
I have tried to call pt regarding elevated PSA. There was no answer. I have sent him a [employer] message. Please schedule MRI of the prostate and follow up with me within a couple days of that.

## 2023-12-19 ENCOUNTER — PATIENT MESSAGE (OUTPATIENT)
Dept: UROLOGY | Facility: CLINIC | Age: 73
End: 2023-12-19
Payer: MEDICARE

## 2023-12-20 ENCOUNTER — PATIENT MESSAGE (OUTPATIENT)
Dept: UROLOGY | Facility: CLINIC | Age: 73
End: 2023-12-20
Payer: MEDICARE

## 2023-12-20 ENCOUNTER — TELEPHONE (OUTPATIENT)
Dept: UROLOGY | Facility: CLINIC | Age: 73
End: 2023-12-20
Payer: MEDICARE

## 2023-12-20 NOTE — TELEPHONE ENCOUNTER
Message left for pt to return call     ----- Message from Aleena Lizama sent at 12/19/2023  3:04 PM CST -----  Name of Who is Calling:pt           What is the request in detail:patient returning call           Can the clinic reply by [facility]:  no         [facility] [phone]

## 2023-12-27 ENCOUNTER — LAB VISIT (OUTPATIENT)
Dept: LAB | Facility: HOSPITAL | Age: 73
End: 2023-12-27
Attending: UROLOGY
Payer: MEDICARE

## 2023-12-27 DIAGNOSIS — R97.20 ELEVATED PSA: ICD-10-CM

## 2023-12-27 LAB
BUN SERPL-MCNC: 12 MG/DL (ref 8–23)
CREAT SERPL-MCNC: 0.7 MG/DL (ref 0.5–1.4)
EST. GFR  (NO RACE VARIABLE): >60 ML/MIN/1.73 M^2

## 2023-12-27 PROCEDURE — 82565 ASSAY OF CREATININE: CPT | Performed by: UROLOGY

## 2023-12-27 PROCEDURE — 36415 COLL VENOUS BLD VENIPUNCTURE: CPT | Mod: PN | Performed by: UROLOGY

## 2023-12-27 PROCEDURE — 84520 ASSAY OF UREA NITROGEN: CPT | Performed by: UROLOGY

## 2023-12-29 ENCOUNTER — HOSPITAL ENCOUNTER (OUTPATIENT)
Dept: RADIOLOGY | Facility: HOSPITAL | Age: 73
Discharge: HOME | End: 2023-12-29
Attending: UROLOGY
Payer: MEDICARE

## 2023-12-29 DIAGNOSIS — R97.20 ELEVATED PSA: ICD-10-CM

## 2023-12-29 PROCEDURE — 72197 MRI PELVIS W/O & W/DYE: CPT | Mod: 26,,, | Performed by: RADIOLOGY

## 2023-12-29 PROCEDURE — 72197 MRI PROSTATE W W/O CONTRAST: ICD-10-PCS | Mod: 26,,, | Performed by: RADIOLOGY

## 2023-12-29 PROCEDURE — 72197 MRI PELVIS W/O & W/DYE: CPT | Mod: TC,PN

## 2023-12-29 RX ORDER — GADOBUTROL 604.72 MG/ML
9 INJECTION INTRAVENOUS
Status: DISCONTINUED | OUTPATIENT
Start: 2023-12-29 | End: 2023-12-30 | Stop reason: HOSPADM

## 2024-01-09 ENCOUNTER — OFFICE VISIT (OUTPATIENT)
Dept: UROLOGY | Facility: CLINIC | Age: 74
End: 2024-01-09
Payer: MEDICARE

## 2024-01-09 VITALS
RESPIRATION RATE: 16 BRPM | WEIGHT: 196.88 LBS | DIASTOLIC BLOOD PRESSURE: 84 MMHG | TEMPERATURE: 98 F | HEIGHT: 70 IN | HEART RATE: 76 BPM | BODY MASS INDEX: 28.18 KG/M2 | SYSTOLIC BLOOD PRESSURE: 168 MMHG

## 2024-01-09 DIAGNOSIS — R97.20 ELEVATED PSA: Primary | ICD-10-CM

## 2024-01-09 DIAGNOSIS — N13.8 BPH WITH OBSTRUCTION/LOWER URINARY TRACT SYMPTOMS: ICD-10-CM

## 2024-01-09 DIAGNOSIS — N40.1 BPH WITH OBSTRUCTION/LOWER URINARY TRACT SYMPTOMS: ICD-10-CM

## 2024-01-09 PROCEDURE — 3008F BODY MASS INDEX DOCD: CPT | Mod: CPTII,S$GLB,, | Performed by: UROLOGY

## 2024-01-09 PROCEDURE — 1126F AMNT PAIN NOTED NONE PRSNT: CPT | Mod: CPTII,S$GLB,, | Performed by: UROLOGY

## 2024-01-09 PROCEDURE — 1101F PT FALLS ASSESS-DOCD LE1/YR: CPT | Mod: CPTII,S$GLB,, | Performed by: UROLOGY

## 2024-01-09 PROCEDURE — 99214 OFFICE O/P EST MOD 30 MIN: CPT | Mod: S$GLB,,, | Performed by: UROLOGY

## 2024-01-09 PROCEDURE — 1159F MED LIST DOCD IN RCRD: CPT | Mod: CPTII,S$GLB,, | Performed by: UROLOGY

## 2024-01-09 PROCEDURE — 3077F SYST BP >= 140 MM HG: CPT | Mod: CPTII,S$GLB,, | Performed by: UROLOGY

## 2024-01-09 PROCEDURE — 3079F DIAST BP 80-89 MM HG: CPT | Mod: CPTII,S$GLB,, | Performed by: UROLOGY

## 2024-01-09 PROCEDURE — 99999 PR PBB SHADOW E&M-EST. PATIENT-LVL IV: CPT | Mod: PBBFAC,,, | Performed by: UROLOGY

## 2024-01-09 PROCEDURE — 3288F FALL RISK ASSESSMENT DOCD: CPT | Mod: CPTII,S$GLB,, | Performed by: UROLOGY

## 2024-01-09 RX ORDER — LEVOFLOXACIN 500 MG/1
TABLET, FILM COATED ORAL
Qty: 1 TABLET | Refills: 0 | Status: SHIPPED | OUTPATIENT
Start: 2024-01-09

## 2024-01-09 NOTE — PROGRESS NOTES
CC: follow up elevated PSA    History of Present Illness:     1/9/24-MRI showing no interval change in the PI RADS 4 lesion. Prostate volume now measuring 105cc. States that he is being worked up for Parkinson's. No h/o UTIs. No dysuria or gross hematuria.   12/15/23-Stream is okay. No gross hematuria or dysuria. No incontinence.   6/14/23- stream is okay. No gross hematuria. Sometimes urgency, but no UUI. Pt started UC treatment 2 months ago. CT scan completed 1/2023 shows a thickened bladder wall and heterogenous prostate. Pt never underwent cysto.   12/14/22-stream is good. Nocturia x 0-1. No gross hematuria. No urge incontinence, but mild urgency. Uses Viagra prn  6/30/22-Stream is okay. No gross hematuria. He does report some urgency, but manageable. Nocturia x 1. Libido is low. Viagra doesn't work well.   11/30/21-good stream. No gross hematuria. No dysuria. No stranguria.   5/28/21-Pt is a 69yo male here for follow up of elevated PSA. No nocturia. Stream is okay. No gross hematuria or dysuria.     Past  history:   TRUS biopsy 5/2019 PSA of 4.3, benign  MRI PI-RADS 4 12/2019, 67cc prostate  MR/US fusion biopsy 2/2020, benign, PSA 6.23  MRI 1/2021 with PI RADS 4 lesion 7mm right peripheral zone apex, stable compared to prior MRI--elected to defer repeat biopsy, PSA decreased    Review of Systems   Constitutional:  Negative for chills and fever.   Gastrointestinal:  Negative for abdominal pain.   Genitourinary:  Negative for difficulty urinating and hematuria.   Neurological:  Negative for numbness and headaches.   All other systems reviewed and are negative.      Current Outpatient Medications   Medication Sig Dispense Refill    amitriptyline (ELAVIL) 100 MG tablet Take 100 mg by mouth.      atorvastatin (LIPITOR) 20 MG tablet Take 20 mg by mouth.      calcipotriene (DOVONOX) 0.005 % cream Apply topically.      clobetasoL (TEMOVATE) 0.05 % cream Apply 1 application topically 2 (two) times daily.       mesalamine (LIALDA) 1.2 gram TbEC       methylPREDNISolone (MEDROL DOSEPACK) 4 mg tablet use as directed 1 each 0    olmesartan (BENICAR) 20 MG tablet       sildenafiL (VIAGRA) 100 MG tablet Take 100 mg by mouth daily as needed.      zolpidem (AMBIEN CR) 12.5 MG CR tablet Take 12.5 mg by mouth.      aspirin (ECOTRIN) 81 MG EC tablet Take 1 tablet by mouth once daily.      levoFLOXacin (LEVAQUIN) 500 MG tablet Take 1 po 1 hour before biopsy 1 tablet 0    primidone (MYSOLINE) 50 MG Tab Take 50 mg by mouth.       No current facility-administered medications for this visit.       Review of patient's allergies indicates:   Allergen Reactions    Sulfa (sulfonamide antibiotics) Rash       Past medical, family, and social history reviewed as documented in chart with pertinent positive medical, family, and social history detailed in HPI.    Physical Exam  Vitals:    01/09/24 0954   BP: (!) 168/84   Pulse: 76   Resp: 16   Temp: 98 °F (36.7 °C)           General: Well-developed, well-nourished, in no acute distress  HEENT: Normocephalic, atraumatic, extraocular eye movements in tact  Neck: supple, trachea midline, no cervical or supraclavicular adenopathy  Respirations: Even and unlabored  Rectal: 6/14/23->40gram prostate without nodules or tenderness. No gross blood.   Extremities: Moves all extremities equally, atraumatic, no clubbing, cyanosis, edema  Skin: warm and dry, no lesions  Psych: normal affect  Neuro: Alert and oriented x 3. Cranial nerves II-XII grossly intact      PSA    12/22/20: 11.0  5/28/21: 4.4  11/30/21: 4.3  6/30/22: 6.5  12/12/22: 5.2  6/7/23: 6.1  7/7/23: 6.0  12/15/23- 7.2      Assessment:   1. Elevated PSA        2. BPH with obstruction/lower urinary tract symptoms                      Plan:  Elevated PSA    BPH with obstruction/lower urinary tract symptoms    Other orders  -     levoFLOXacin (LEVAQUIN) 500 MG tablet; Take 1 po 1 hour before biopsy  Dispense: 1 tablet; Refill: 0    Discussed growth of  prostate on sequential MRIs. Discussed relative stability of the PI RADS 4 lesion. Discussed options of observation vs repeat MR/US fusion biopsy. Pt would like to move forward with biopsy.     Follow up for MRI/US fusion biopsy.

## 2024-02-26 ENCOUNTER — PROCEDURE VISIT (OUTPATIENT)
Dept: UROLOGY | Facility: CLINIC | Age: 74
End: 2024-02-26
Payer: MEDICARE

## 2024-02-26 VITALS
WEIGHT: 190.5 LBS | HEIGHT: 70 IN | HEART RATE: 80 BPM | SYSTOLIC BLOOD PRESSURE: 155 MMHG | BODY MASS INDEX: 27.27 KG/M2 | TEMPERATURE: 98 F | RESPIRATION RATE: 16 BRPM | DIASTOLIC BLOOD PRESSURE: 83 MMHG

## 2024-02-26 DIAGNOSIS — R97.20 ELEVATED PSA: Primary | ICD-10-CM

## 2024-02-26 LAB
BILIRUB UR QL STRIP: NEGATIVE
GLUCOSE UR QL STRIP: NEGATIVE
KETONES UR QL STRIP: NEGATIVE
LEUKOCYTE ESTERASE UR QL STRIP: NEGATIVE
PH, POC UA: 6
POC BLOOD, URINE: POSITIVE
POC NITRATES, URINE: NEGATIVE
PROT UR QL STRIP: NEGATIVE
SP GR UR STRIP: 1.01 (ref 1–1.03)
UROBILINOGEN UR STRIP-ACNC: 0.2 (ref 0.3–2.2)

## 2024-02-26 PROCEDURE — 96372 THER/PROPH/DIAG INJ SC/IM: CPT | Mod: 59,S$GLB,, | Performed by: UROLOGY

## 2024-02-26 PROCEDURE — 55700 PR BIOPSY OF PROSTATE,NEEDLE/PUNCH: CPT | Mod: S$GLB,,, | Performed by: UROLOGY

## 2024-02-26 PROCEDURE — 88305 TISSUE EXAM BY PATHOLOGIST: CPT | Mod: 59 | Performed by: PATHOLOGY

## 2024-02-26 PROCEDURE — 88305 TISSUE EXAM BY PATHOLOGIST: CPT | Mod: 26,,, | Performed by: PATHOLOGY

## 2024-02-26 PROCEDURE — 76872 US TRANSRECTAL: CPT | Mod: 26,S$GLB,, | Performed by: UROLOGY

## 2024-02-26 PROCEDURE — 88344 IMHCHEM/IMCYTCHM EA MLT ANTB: CPT | Mod: 26,,, | Performed by: PATHOLOGY

## 2024-02-26 PROCEDURE — 88344 IMHCHEM/IMCYTCHM EA MLT ANTB: CPT | Performed by: PATHOLOGY

## 2024-02-26 PROCEDURE — 81003 URINALYSIS AUTO W/O SCOPE: CPT | Mod: QW,S$GLB,, | Performed by: UROLOGY

## 2024-02-26 RX ORDER — CEFTRIAXONE 1 G/1
1 INJECTION, POWDER, FOR SOLUTION INTRAMUSCULAR; INTRAVENOUS
Status: COMPLETED | OUTPATIENT
Start: 2024-02-26 | End: 2024-02-26

## 2024-02-26 RX ADMIN — CEFTRIAXONE 1 G: 1 INJECTION, POWDER, FOR SOLUTION INTRAMUSCULAR; INTRAVENOUS at 01:02

## 2024-02-26 NOTE — PROCEDURES
Procedure: (1) Transrectal Prostate Biopsy                      (2) Transrectal ultrasound of prostate with MRI fusion of images                     (3) Ultrasound Guidance of Prostate Biopsy needles    Reason for procedure: elevated PSA, abnormal MRI      Detail: Antibiotic prophylaxis was provided using levaquin and Rocephin. After proper consents were obtained, the patient was prepped and draped in normal fashion in the left lateral decubitus position for TRUS/Bx.  5 ml of lidocaine jelly was instilled in the rectum.  The U/S with rectal probe was into the patient's rectum. A sweep of the prostate was performed from left to right in the sagittal plane, and the MRI images were aligned with the ultrasound images. A spinal needle was used and 10ml of 1% lidocaine was instilled on the side of the prostate at the base of the seminal vesicles. Systematic review was performed of the prostate, noting a hypoechoic lesion at the left mid gland. The prostate measured to be 102g. The region of interest was first targeted. Biopsy was then performed using an 18Ga biopsy needle in a standard fashion directed at the base, mid and apex bilaterally for a total of 16 cores. The patient tolerated the procedure well. Estimated blood loss was 3cc.     Juan C was seen today for other.    Diagnoses and all orders for this visit:    Elevated PSA  -     POCT Urinalysis, Dipstick, Automated, W/O Scope    Other orders  -     cefTRIAXone injection 1 g        I had a detailed discussion with the patient regarding post-TRUS biopsy fever and need to go to the ED for admission for IV antibiotics for any temperature above 100.4 degrees.     He will follow up in 1 week for TRUS biopsy results.

## 2024-02-29 ENCOUNTER — PATIENT MESSAGE (OUTPATIENT)
Dept: UROLOGY | Facility: CLINIC | Age: 74
End: 2024-02-29
Payer: MEDICARE

## 2024-03-05 LAB
FINAL PATHOLOGIC DIAGNOSIS: NORMAL
GROSS: NORMAL
Lab: NORMAL
MICROSCOPIC EXAM: NORMAL

## 2024-03-12 ENCOUNTER — OFFICE VISIT (OUTPATIENT)
Dept: UROLOGY | Facility: CLINIC | Age: 74
End: 2024-03-12
Payer: MEDICARE

## 2024-03-12 VITALS
HEIGHT: 70 IN | RESPIRATION RATE: 17 BRPM | DIASTOLIC BLOOD PRESSURE: 83 MMHG | WEIGHT: 191.38 LBS | BODY MASS INDEX: 27.4 KG/M2 | TEMPERATURE: 98 F | SYSTOLIC BLOOD PRESSURE: 156 MMHG | HEART RATE: 63 BPM

## 2024-03-12 DIAGNOSIS — N13.8 BPH WITH OBSTRUCTION/LOWER URINARY TRACT SYMPTOMS: ICD-10-CM

## 2024-03-12 DIAGNOSIS — R97.20 ELEVATED PSA: Primary | ICD-10-CM

## 2024-03-12 DIAGNOSIS — N40.1 BPH WITH OBSTRUCTION/LOWER URINARY TRACT SYMPTOMS: ICD-10-CM

## 2024-03-12 PROCEDURE — 4010F ACE/ARB THERAPY RXD/TAKEN: CPT | Mod: CPTII,S$GLB,, | Performed by: UROLOGY

## 2024-03-12 PROCEDURE — 1101F PT FALLS ASSESS-DOCD LE1/YR: CPT | Mod: CPTII,S$GLB,, | Performed by: UROLOGY

## 2024-03-12 PROCEDURE — 99213 OFFICE O/P EST LOW 20 MIN: CPT | Mod: S$GLB,,, | Performed by: UROLOGY

## 2024-03-12 PROCEDURE — 3288F FALL RISK ASSESSMENT DOCD: CPT | Mod: CPTII,S$GLB,, | Performed by: UROLOGY

## 2024-03-12 PROCEDURE — 3008F BODY MASS INDEX DOCD: CPT | Mod: CPTII,S$GLB,, | Performed by: UROLOGY

## 2024-03-12 PROCEDURE — 3077F SYST BP >= 140 MM HG: CPT | Mod: CPTII,S$GLB,, | Performed by: UROLOGY

## 2024-03-12 PROCEDURE — 3079F DIAST BP 80-89 MM HG: CPT | Mod: CPTII,S$GLB,, | Performed by: UROLOGY

## 2024-03-12 PROCEDURE — 1159F MED LIST DOCD IN RCRD: CPT | Mod: CPTII,S$GLB,, | Performed by: UROLOGY

## 2024-03-12 PROCEDURE — 99999 PR PBB SHADOW E&M-EST. PATIENT-LVL IV: CPT | Mod: PBBFAC,,, | Performed by: UROLOGY

## 2024-03-12 PROCEDURE — 1126F AMNT PAIN NOTED NONE PRSNT: CPT | Mod: CPTII,S$GLB,, | Performed by: UROLOGY

## 2024-03-12 NOTE — PROGRESS NOTES
CC: follow up elevated PSA    History of Present Illness:     3/12/24-UroNav biopsy showing benign prostatic tissue only. Pt denies any issues following biopsy. LUTS are stable--stream is fine. Not bothersome. No incontinence. No stranguria. No gross hematuria.   1/9/24-MRI showing no interval change in the PI RADS 4 lesion. Prostate volume now measuring 105cc. States that he is being worked up for Parkinson's. No h/o UTIs. No dysuria or gross hematuria.   12/15/23-Stream is okay. No gross hematuria or dysuria. No incontinence.   6/14/23- stream is okay. No gross hematuria. Sometimes urgency, but no UUI. Pt started UC treatment 2 months ago. CT scan completed 1/2023 shows a thickened bladder wall and heterogenous prostate. Pt never underwent cysto.   12/14/22-stream is good. Nocturia x 0-1. No gross hematuria. No urge incontinence, but mild urgency. Uses Viagra prn  6/30/22-Stream is okay. No gross hematuria. He does report some urgency, but manageable. Nocturia x 1. Libido is low. Viagra doesn't work well.   11/30/21-good stream. No gross hematuria. No dysuria. No stranguria.   5/28/21-Pt is a 69yo male here for follow up of elevated PSA. No nocturia. Stream is okay. No gross hematuria or dysuria.     Past  history:   TRUS biopsy 5/2019 PSA of 4.3, benign  MRI PI-RADS 4 12/2019, 67cc prostate  MR/US fusion biopsy 2/2020, benign, PSA 6.23  MRI 1/2021 with PI RADS 4 lesion 7mm right peripheral zone apex, stable compared to prior MRI--elected to defer repeat biopsy, PSA decreased    Review of Systems   Constitutional:  Negative for chills and fever.   Gastrointestinal:  Negative for abdominal pain.   Genitourinary:  Negative for difficulty urinating and hematuria.   Neurological:  Negative for numbness and headaches.   All other systems reviewed and are negative.      Current Outpatient Medications   Medication Sig Dispense Refill    amitriptyline (ELAVIL) 100 MG tablet Take 100 mg by mouth.      aspirin (ECOTRIN)  81 MG EC tablet Take 1 tablet by mouth once daily.      atorvastatin (LIPITOR) 20 MG tablet Take 20 mg by mouth.      calcipotriene (DOVONOX) 0.005 % cream Apply topically.      clobetasoL (TEMOVATE) 0.05 % cream Apply 1 application topically 2 (two) times daily.      levoFLOXacin (LEVAQUIN) 500 MG tablet Take 1 po 1 hour before biopsy 1 tablet 0    mesalamine (LIALDA) 1.2 gram TbEC       methylPREDNISolone (MEDROL DOSEPACK) 4 mg tablet use as directed 1 each 0    olmesartan (BENICAR) 20 MG tablet       primidone (MYSOLINE) 50 MG Tab Take 50 mg by mouth.      sildenafiL (VIAGRA) 100 MG tablet Take 100 mg by mouth daily as needed.      zolpidem (AMBIEN CR) 12.5 MG CR tablet Take 12.5 mg by mouth.       No current facility-administered medications for this visit.       Review of patient's allergies indicates:   Allergen Reactions    Sulfa (sulfonamide antibiotics) Rash       Past medical, family, and social history reviewed as documented in chart with pertinent positive medical, family, and social history detailed in HPI.    Physical Exam  Vitals:    03/12/24 1023   BP: (!) 156/83   Pulse: 63   Resp: 17   Temp: 97.5 °F (36.4 °C)           General: Well-developed, well-nourished, in no acute distress  HEENT: Normocephalic, atraumatic, extraocular eye movements in tact  Neck: supple, trachea midline, no cervical or supraclavicular adenopathy  Respirations: Even and unlabored  Rectal: 6/14/23->40gram prostate without nodules or tenderness. No gross blood.   Extremities: Moves all extremities equally, atraumatic, no clubbing, cyanosis, edema  Skin: warm and dry, no lesions  Psych: normal affect  Neuro: Alert and oriented x 3. Cranial nerves II-XII grossly intact      PSA    12/22/20: 11.0  5/28/21: 4.4  11/30/21: 4.3  6/30/22: 6.5  12/12/22: 5.2  6/7/23: 6.1  7/7/23: 6.0  12/15/23- 7.2      Assessment:   1. Elevated PSA  Prostate Specific Antigen, Diagnostic      2. BPH with obstruction/lower urinary tract symptoms                         Plan:  Elevated PSA  -     Prostate Specific Antigen, Diagnostic; Future; Expected date: 09/12/2024    BPH with obstruction/lower urinary tract symptoms      Discussed pharmacologic management and brief discussion of surgical management of BPH. Pt not currently interested.     Follow up in about 6 months (around 9/12/2024) for labs before visit.

## 2024-07-19 ENCOUNTER — OFFICE VISIT (OUTPATIENT)
Dept: PODIATRY | Facility: CLINIC | Age: 74
End: 2024-07-19
Payer: MEDICARE

## 2024-07-19 ENCOUNTER — HOSPITAL ENCOUNTER (OUTPATIENT)
Dept: RADIOLOGY | Facility: HOSPITAL | Age: 74
Discharge: HOME | End: 2024-07-19
Attending: PODIATRIST
Payer: MEDICARE

## 2024-07-19 VITALS — HEIGHT: 70 IN | WEIGHT: 191.38 LBS | BODY MASS INDEX: 27.4 KG/M2

## 2024-07-19 DIAGNOSIS — M79.672 FOOT PAIN, LEFT: ICD-10-CM

## 2024-07-19 DIAGNOSIS — Q82.8 POROKERATOSIS: Primary | ICD-10-CM

## 2024-07-19 DIAGNOSIS — Q82.8 POROKERATOSIS: ICD-10-CM

## 2024-07-19 PROCEDURE — 1125F AMNT PAIN NOTED PAIN PRSNT: CPT | Mod: CPTII,S$GLB,, | Performed by: PODIATRIST

## 2024-07-19 PROCEDURE — 99214 OFFICE O/P EST MOD 30 MIN: CPT | Mod: S$GLB,,, | Performed by: PODIATRIST

## 2024-07-19 PROCEDURE — 1101F PT FALLS ASSESS-DOCD LE1/YR: CPT | Mod: CPTII,S$GLB,, | Performed by: PODIATRIST

## 2024-07-19 PROCEDURE — 3288F FALL RISK ASSESSMENT DOCD: CPT | Mod: CPTII,S$GLB,, | Performed by: PODIATRIST

## 2024-07-19 PROCEDURE — 73630 X-RAY EXAM OF FOOT: CPT | Mod: 26,LT,, | Performed by: RADIOLOGY

## 2024-07-19 PROCEDURE — 99999 PR PBB SHADOW E&M-EST. PATIENT-LVL III: CPT | Mod: PBBFAC,,, | Performed by: PODIATRIST

## 2024-07-19 PROCEDURE — 3008F BODY MASS INDEX DOCD: CPT | Mod: CPTII,S$GLB,, | Performed by: PODIATRIST

## 2024-07-19 PROCEDURE — 73630 X-RAY EXAM OF FOOT: CPT | Mod: TC,FY,PO,LT

## 2024-07-19 PROCEDURE — 1159F MED LIST DOCD IN RCRD: CPT | Mod: CPTII,S$GLB,, | Performed by: PODIATRIST

## 2024-07-19 PROCEDURE — 4010F ACE/ARB THERAPY RXD/TAKEN: CPT | Mod: CPTII,S$GLB,, | Performed by: PODIATRIST

## 2024-07-19 NOTE — PATIENT INSTRUCTIONS
Thank you for choosing [facility] and Dr. Shelley Swift and her team to take care of you.      I have provided further information for you about your diagnoses and/or aftercare for treatment.     You may receive a survey asking you about your visit today. We hope that we have provided you with a 5-star experience! If you felt that you received exemplary care today, please consider leaving us this feedback on the survey that you will receive in the next few days.     The survey might arrive via email, Twitter messages, text messages, or paper mail.    We sincerely appreciate you!      Sincerely,  Dr. Shelley Swift         Porokeratosis    Many people suffer from painful growths on the bottom of their feet. The origins of these growths are varied. The most common growth on the bottom of the foot that may be painful is known as a callus, which is a broad layer of hard skin similar in appearance to the calluses one develops on their hands after doing labor like raking leaves.   However, some people develop small hard growths usually more than one on the bottom of their feet, many will complain they feel like they have a stone in their shoe. Although there are potentially many origins of this type of lesion including warts and intractable plantar keratoma, a very common cause of this type of growth is known as a porokeratosis.  Porokeratosis is a general term to a group of dermatological problems that occur in various parts of the body. This discussion is limited to punctate porokeratosis. These lesions are described as dozens of discrete or grouped seedlike hyperkeratotic lesions with characteristic thin, raised ridgelike margins that develop on the palms and the soles. Patients usually have other forms of porokeratosis as well, most commonly the linear or M[place]elli types. Because of their description many patients will refer to them as seed corns.  These lesions generally occur on the weight bearing part of  You are doing well on current meds for blood pressure. Please come back before your move to [place]   "the foot which is the ball of the foot and the heel but may also occur in the mid arch. These lesions are generally not associated with any bony prominence as is the case with a callus or an intractable plantar keratoma.   These lesions are thought to be nothing more than plugged sweat glands however there is some debate as to whether this is true or not. In any event if you consider that the feet can have as many as 250,000 sweat glands it is easy to see how some of them could get "clogged" from contstant friction and pressure and cause this condition to occur.  The degree of discomfort a patient will experience will vary from person to person. In many people they are a non-issue but in others they can be very painful. Factors that may exacerbate the pain include the number of lesions, their location, the type of shoe a patient wears, the amount of walking a person does during the course of a day and even possibly the amount or lack of fat on the bottom of the foot.    TREATMENT    For this reason there are various treatments ranging from absolutely nothing to surgical excision. In cases where there is pain during ambulation most podiatrists will attempt to curette (carve out) the lesion(s) from the surrounding skin.   In most cases this can be done without anesthesia and is not a particularly painful procedure. This is usually what I do the first time a patient presents with this type of problem. Based on recurrence, if any, will determine further treatment. Many times curetting the lesion(s) will be the first and last treatment for these lesions. In other cases after a period of relief ranging from a couple of days to a few years the patient may return complaining of pain in the same area. If it has been a fairly long time since last treatment I may just go ahead and curette the lesion again.   Wearing a cushioned innersole, although it will not rid you of the porokeratosis may reduce the pressure on these " lesions and make it more comfortable to walk.    I recommend using Flexitol Heel Balm cream at night every night before bed.  This can be found at [employer], [employer], or [facility].

## 2024-07-19 NOTE — PROGRESS NOTES
Podiatry Department    Patient ID: Juan C Ferguson is a 73 y.o. male.    Chief Complaint: Foot Problem (C/o stepping on something x 1 month, plantar aspect of the left foot, soreness, rates pain 4/10, non-diabetic, wears casual shoes and socks)    History of Present Illness    CHIEF COMPLAINT:  Patient presents today for evaluation of left foot pain.    LEFT FOOT PAIN:  He reports left foot pain that began approximately one month ago after stepping on something, though he did not see any foreign object at that time. He experiences discomfort with walking, describing it as an irritation rather than pain. He has the sensation of walking on a small rock or pebble in the affected area.    FOOT INJURY HISTORY:  He has a history of a plantar wart on his foot that he previously dug out himself.    RECENT PROLONGED STANDING:  Approximately two weeks ago, he spent four hours standing on a ladder in one day.    ROS:  Musculoskeletal: -muscle pain, +joint pain         Physical Exam    Musculoskeletal: Hyperkeratotic tissue on plantar lateral aspect of left foot at the level of proximal fourth metatarsal. No puncture wound noted. No edema along callus site. No erythema along callus site.  Cardiovascular: Palpable pedal pulses. Varicosities on bilateral extremities.         Diagnoses:  1. Porokeratosis  -     X-Ray Foot Complete Left; Future; Expected date: 07/19/2024    2. Foot pain, left  -     X-Ray Foot Complete Left; Future; Expected date: 07/19/2024      Assessment & Plan    - Hyperkeratotic tissue noted on plantar lateral aspect of left foot at level of proximal 4th metatarsal, consistent with porokeratosis or callus  - No puncture wound, edema or erythema noted at callus site  - Shaved down callus, no foreign body visualized  - Applied silver nitrate to help visualize area on x-ray  - Will review foot x-ray to definitively rule out presence of foreign body.  LEFT FOOT INJURY:  - X-ray of left foot ordered.  - Will  review x-ray results and message patient through [employer] portal with findings.  FOLLOW UP:  - Patient does not need to return to clinic.           Future Appointments   Date Time Provider Department [place]   8/30/2024  9:00 AM LABORATORY, [facility]   9/13/2024  8:15 AM Suzi Tolentino MD [facility]        This note was generated with the assistance of ambient listening technology. Verbal consent was obtained by the patient and accompanying visitor(s) for the recording of patient appointment to facilitate this note. I attest to having reviewed and edited the generated note for accuracy, though some syntax or spelling errors may persist. Please contact the author of this note for any clarification.      Report Electronically Signed By:     Shelley Swift DPM   Podiatry  [facility]  7/19/2024

## 2024-08-30 ENCOUNTER — LAB VISIT (OUTPATIENT)
Dept: LAB | Facility: HOSPITAL | Age: 74
End: 2024-08-30
Attending: UROLOGY
Payer: MEDICARE

## 2024-08-30 DIAGNOSIS — R97.20 ELEVATED PSA: ICD-10-CM

## 2024-08-30 LAB — COMPLEXED PSA SERPL-MCNC: 6.3 NG/ML (ref 0–4)

## 2024-08-30 PROCEDURE — 36415 COLL VENOUS BLD VENIPUNCTURE: CPT | Mod: PN | Performed by: UROLOGY

## 2024-08-30 PROCEDURE — 84153 ASSAY OF PSA TOTAL: CPT | Performed by: UROLOGY

## 2024-09-10 ENCOUNTER — OFFICE VISIT (OUTPATIENT)
Dept: UROLOGY | Facility: CLINIC | Age: 74
End: 2024-09-10
Payer: MEDICARE

## 2024-09-10 VITALS
HEIGHT: 70 IN | SYSTOLIC BLOOD PRESSURE: 127 MMHG | DIASTOLIC BLOOD PRESSURE: 79 MMHG | WEIGHT: 184.75 LBS | BODY MASS INDEX: 26.45 KG/M2 | HEART RATE: 65 BPM

## 2024-09-10 DIAGNOSIS — N40.1 BPH WITH OBSTRUCTION/LOWER URINARY TRACT SYMPTOMS: Primary | ICD-10-CM

## 2024-09-10 DIAGNOSIS — R97.20 ELEVATED PSA: ICD-10-CM

## 2024-09-10 DIAGNOSIS — N13.8 BPH WITH OBSTRUCTION/LOWER URINARY TRACT SYMPTOMS: Primary | ICD-10-CM

## 2024-09-10 LAB
BILIRUB UR QL STRIP: NEGATIVE
GLUCOSE UR QL STRIP: NEGATIVE
KETONES UR QL STRIP: NEGATIVE
LEUKOCYTE ESTERASE UR QL STRIP: NEGATIVE
PH, POC UA: 6.5
POC BLOOD, URINE: POSITIVE
POC NITRATES, URINE: NEGATIVE
POC RESIDUAL URINE VOLUME: 184 ML (ref 0–100)
PROT UR QL STRIP: NEGATIVE
SP GR UR STRIP: 1.02 (ref 1–1.03)
UROBILINOGEN UR STRIP-ACNC: 0.2 (ref 0.3–2.2)

## 2024-09-10 PROCEDURE — 81003 URINALYSIS AUTO W/O SCOPE: CPT | Mod: QW,S$GLB,, | Performed by: UROLOGY

## 2024-09-10 PROCEDURE — 99213 OFFICE O/P EST LOW 20 MIN: CPT | Mod: S$GLB,,, | Performed by: UROLOGY

## 2024-09-10 PROCEDURE — 3078F DIAST BP <80 MM HG: CPT | Mod: CPTII,S$GLB,, | Performed by: UROLOGY

## 2024-09-10 PROCEDURE — 1101F PT FALLS ASSESS-DOCD LE1/YR: CPT | Mod: CPTII,S$GLB,, | Performed by: UROLOGY

## 2024-09-10 PROCEDURE — 4010F ACE/ARB THERAPY RXD/TAKEN: CPT | Mod: CPTII,S$GLB,, | Performed by: UROLOGY

## 2024-09-10 PROCEDURE — 1159F MED LIST DOCD IN RCRD: CPT | Mod: CPTII,S$GLB,, | Performed by: UROLOGY

## 2024-09-10 PROCEDURE — 99999 PR PBB SHADOW E&M-EST. PATIENT-LVL III: CPT | Mod: PBBFAC,,, | Performed by: UROLOGY

## 2024-09-10 PROCEDURE — 3288F FALL RISK ASSESSMENT DOCD: CPT | Mod: CPTII,S$GLB,, | Performed by: UROLOGY

## 2024-09-10 PROCEDURE — 1126F AMNT PAIN NOTED NONE PRSNT: CPT | Mod: CPTII,S$GLB,, | Performed by: UROLOGY

## 2024-09-10 PROCEDURE — 3074F SYST BP LT 130 MM HG: CPT | Mod: CPTII,S$GLB,, | Performed by: UROLOGY

## 2024-09-10 PROCEDURE — 51798 US URINE CAPACITY MEASURE: CPT | Mod: S$GLB,,, | Performed by: UROLOGY

## 2024-09-10 NOTE — PROGRESS NOTES
CC: follow up elevated PSA    History of Present Illness:     9/10/24-No change in LUTS. Sometimes he doesn't empty, but no difference than before. No gross hematuria or dysuria. No stranguria.     3/12/24-UroNav biopsy showing benign prostatic tissue only. Pt denies any issues following biopsy. LUTS are stable--stream is fine. Not bothersome. No incontinence. No stranguria. No gross hematuria.   1/9/24-MRI showing no interval change in the PI RADS 4 lesion. Prostate volume now measuring 105cc. States that he is being worked up for Parkinson's. No h/o UTIs. No dysuria or gross hematuria.   12/15/23-Stream is okay. No gross hematuria or dysuria. No incontinence.   6/14/23- stream is okay. No gross hematuria. Sometimes urgency, but no UUI. Pt started UC treatment 2 months ago. CT scan completed 1/2023 shows a thickened bladder wall and heterogenous prostate. Pt never underwent cysto.   12/14/22-stream is good. Nocturia x 0-1. No gross hematuria. No urge incontinence, but mild urgency. Uses Viagra prn  6/30/22-Stream is okay. No gross hematuria. He does report some urgency, but manageable. Nocturia x 1. Libido is low. Viagra doesn't work well.   11/30/21-good stream. No gross hematuria. No dysuria. No stranguria.   5/28/21-Pt is a 71yo male here for follow up of elevated PSA. No nocturia. Stream is okay. No gross hematuria or dysuria.     Past  history:   TRUS biopsy 5/2019 PSA of 4.3, benign  MRI PI-RADS 4 12/2019, 67cc prostate  MR/US fusion biopsy 2/2020, benign, PSA 6.23  MRI 1/2021 with PI RADS 4 lesion 7mm right peripheral zone apex, stable compared to prior MRI--elected to defer repeat biopsy, PSA decreased    Review of Systems   Constitutional:  Negative for chills and fever.   Gastrointestinal:  Negative for abdominal pain.   Genitourinary:  Negative for difficulty urinating and hematuria.   Neurological:  Negative for numbness and headaches.   All other systems reviewed and are negative.      Current  Outpatient Medications   Medication Sig Dispense Refill    amitriptyline (ELAVIL) 100 MG tablet Take 100 mg by mouth.      aspirin (ECOTRIN) 81 MG EC tablet Take 1 tablet by mouth once daily.      atorvastatin (LIPITOR) 20 MG tablet Take 20 mg by mouth.      calcipotriene (DOVONOX) 0.005 % cream Apply topically.      clobetasoL (TEMOVATE) 0.05 % cream Apply 1 application topically 2 (two) times daily.      levoFLOXacin (LEVAQUIN) 500 MG tablet Take 1 po 1 hour before biopsy 1 tablet 0    mesalamine (LIALDA) 1.2 gram TbEC       methylPREDNISolone (MEDROL DOSEPACK) 4 mg tablet use as directed 1 each 0    olmesartan (BENICAR) 20 MG tablet       primidone (MYSOLINE) 50 MG Tab Take 50 mg by mouth.      sildenafiL (VIAGRA) 100 MG tablet Take 100 mg by mouth daily as needed.      zolpidem (AMBIEN CR) 12.5 MG CR tablet Take 12.5 mg by mouth.       No current facility-administered medications for this visit.       Review of patient's allergies indicates:   Allergen Reactions    Sulfa (sulfonamide antibiotics) Rash       Past medical, family, and social history reviewed as documented in chart with pertinent positive medical, family, and social history detailed in HPI.    Physical Exam  Vitals:    09/10/24 1023   BP: 127/79   Pulse: 65           General: Well-developed, well-nourished, in no acute distress  HEENT: Normocephalic, atraumatic, extraocular eye movements in tact  Neck: supple, trachea midline, no cervical or supraclavicular adenopathy  Respirations: Even and unlabored  Rectal: 6/14/23->40gram prostate without nodules or tenderness. No gross blood.   Extremities: Moves all extremities equally, atraumatic, no clubbing, cyanosis, edema  Skin: warm and dry, no lesions  Psych: normal affect  Neuro: Alert and oriented x 3. Cranial nerves II-XII grossly intact      PSA    12/22/20: 11.0  5/28/21: 4.4  11/30/21: 4.3  6/30/22: 6.5  12/12/22: 5.2  6/7/23: 6.1  7/7/23: 6.0  12/15/23- 7.2  8/30/24: 6.3      Assessment:   1.  BPH with obstruction/lower urinary tract symptoms  POCT Bladder Scan    POCT Urinalysis, Dipstick, Automated, W/O Scope      2. Elevated PSA  Prostate Specific Antigen, Diagnostic                      Plan:  BPH with obstruction/lower urinary tract symptoms  -     POCT Bladder Scan  -     POCT Urinalysis, Dipstick, Automated, W/O Scope    Elevated PSA  -     Prostate Specific Antigen, Diagnostic; Future; Expected date: 03/10/2025          Follow up in about 6 months (around 3/10/2025) for labs before visit.

## 2024-10-24 ENCOUNTER — PATIENT MESSAGE (OUTPATIENT)
Dept: RESEARCH | Facility: HOSPITAL | Age: 74
End: 2024-10-24
Payer: MEDICARE

## 2025-01-03 NOTE — PROGRESS NOTES
Rocephin 1gm administered IM to Right ventrogluteal using aseptic technique. Pt tolerated procedure well. Patient agreed to wait 15 minutes after injection in the clinic and to report any adverse reactions.     Kenia Whittaker LPN     same name as above

## 2025-03-05 ENCOUNTER — LAB VISIT (OUTPATIENT)
Dept: LAB | Facility: HOSPITAL | Age: 75
End: 2025-03-05
Attending: UROLOGY
Payer: MEDICARE

## 2025-03-05 DIAGNOSIS — R97.20 ELEVATED PSA: ICD-10-CM

## 2025-03-05 PROCEDURE — 84153 ASSAY OF PSA TOTAL: CPT | Performed by: UROLOGY

## 2025-03-05 PROCEDURE — 36415 COLL VENOUS BLD VENIPUNCTURE: CPT | Mod: PN | Performed by: UROLOGY

## 2025-03-06 LAB — COMPLEXED PSA SERPL-MCNC: 5.4 NG/ML (ref 0–4)

## 2025-03-12 ENCOUNTER — OFFICE VISIT (OUTPATIENT)
Dept: UROLOGY | Facility: CLINIC | Age: 75
End: 2025-03-12
Payer: MEDICARE

## 2025-03-12 VITALS
TEMPERATURE: 98 F | HEART RATE: 63 BPM | RESPIRATION RATE: 17 BRPM | WEIGHT: 185.63 LBS | BODY MASS INDEX: 26.57 KG/M2 | DIASTOLIC BLOOD PRESSURE: 69 MMHG | HEIGHT: 70 IN | SYSTOLIC BLOOD PRESSURE: 128 MMHG

## 2025-03-12 DIAGNOSIS — N40.1 BPH WITH OBSTRUCTION/LOWER URINARY TRACT SYMPTOMS: Primary | ICD-10-CM

## 2025-03-12 DIAGNOSIS — N13.8 BPH WITH OBSTRUCTION/LOWER URINARY TRACT SYMPTOMS: Primary | ICD-10-CM

## 2025-03-12 DIAGNOSIS — R97.20 ELEVATED PSA: ICD-10-CM

## 2025-03-12 DIAGNOSIS — R31.29 MICROHEMATURIA: ICD-10-CM

## 2025-03-12 LAB
BILIRUB UR QL STRIP: NEGATIVE
GLUCOSE UR QL STRIP: NEGATIVE
KETONES UR QL STRIP: NEGATIVE
LEUKOCYTE ESTERASE UR QL STRIP: NEGATIVE
PH, POC UA: 5.5
POC BLOOD, URINE: POSITIVE
POC NITRATES, URINE: NEGATIVE
POC RESIDUAL URINE VOLUME: 149 ML (ref 0–100)
PROT UR QL STRIP: NEGATIVE
SP GR UR STRIP: 1.02 (ref 1–1.03)
UROBILINOGEN UR STRIP-ACNC: 0.2 (ref 0.3–2.2)

## 2025-03-12 PROCEDURE — 3078F DIAST BP <80 MM HG: CPT | Mod: CPTII,S$GLB,, | Performed by: UROLOGY

## 2025-03-12 PROCEDURE — 51798 US URINE CAPACITY MEASURE: CPT | Mod: S$GLB,,, | Performed by: UROLOGY

## 2025-03-12 PROCEDURE — 1126F AMNT PAIN NOTED NONE PRSNT: CPT | Mod: CPTII,S$GLB,, | Performed by: UROLOGY

## 2025-03-12 PROCEDURE — 99214 OFFICE O/P EST MOD 30 MIN: CPT | Mod: S$GLB,,, | Performed by: UROLOGY

## 2025-03-12 PROCEDURE — 1159F MED LIST DOCD IN RCRD: CPT | Mod: CPTII,S$GLB,, | Performed by: UROLOGY

## 2025-03-12 PROCEDURE — 3288F FALL RISK ASSESSMENT DOCD: CPT | Mod: CPTII,S$GLB,, | Performed by: UROLOGY

## 2025-03-12 PROCEDURE — 3074F SYST BP LT 130 MM HG: CPT | Mod: CPTII,S$GLB,, | Performed by: UROLOGY

## 2025-03-12 PROCEDURE — 99999 PR PBB SHADOW E&M-EST. PATIENT-LVL IV: CPT | Mod: PBBFAC,,, | Performed by: UROLOGY

## 2025-03-12 PROCEDURE — 81003 URINALYSIS AUTO W/O SCOPE: CPT | Mod: QW,S$GLB,, | Performed by: UROLOGY

## 2025-03-12 PROCEDURE — 1160F RVW MEDS BY RX/DR IN RCRD: CPT | Mod: CPTII,S$GLB,, | Performed by: UROLOGY

## 2025-03-12 PROCEDURE — 1101F PT FALLS ASSESS-DOCD LE1/YR: CPT | Mod: CPTII,S$GLB,, | Performed by: UROLOGY

## 2025-03-12 RX ORDER — TAMSULOSIN HYDROCHLORIDE 0.4 MG/1
0.4 CAPSULE ORAL DAILY
Qty: 90 CAPSULE | Refills: 4 | Status: SHIPPED | OUTPATIENT
Start: 2025-03-12 | End: 2026-03-12

## 2025-03-12 NOTE — PROGRESS NOTES
CC: follow up elevated PSA    History of Present Illness:     3/12/25-Pt reports urinary frequency and incomplete emptying. No gross hematuria. Drinks primarily tea and diet coke. No gross hematuria. IPSS 12, QoL 0 (delighted).     9/10/24-No change in LUTS. Sometimes he doesn't empty, but no difference than before. No gross hematuria or dysuria. No stranguria.     3/12/24-UroNav biopsy showing benign prostatic tissue only. Pt denies any issues following biopsy. LUTS are stable--stream is fine. Not bothersome. No incontinence. No stranguria. No gross hematuria.   1/9/24-MRI showing no interval change in the PI RADS 4 lesion. Prostate volume now measuring 105cc. States that he is being worked up for Parkinson's. No h/o UTIs. No dysuria or gross hematuria.   12/15/23-Stream is okay. No gross hematuria or dysuria. No incontinence.   6/14/23- stream is okay. No gross hematuria. Sometimes urgency, but no UUI. Pt started UC treatment 2 months ago. CT scan completed 1/2023 shows a thickened bladder wall and heterogenous prostate. Pt never underwent cysto.   12/14/22-stream is good. Nocturia x 0-1. No gross hematuria. No urge incontinence, but mild urgency. Uses Viagra prn  6/30/22-Stream is okay. No gross hematuria. He does report some urgency, but manageable. Nocturia x 1. Libido is low. Viagra doesn't work well.   11/30/21-good stream. No gross hematuria. No dysuria. No stranguria.   5/28/21-Pt is a 71yo male here for follow up of elevated PSA. No nocturia. Stream is okay. No gross hematuria or dysuria.     Past  history:   TRUS biopsy 5/2019 PSA of 4.3, benign  MRI PI-RADS 4 12/2019, 67cc prostate  MR/US fusion biopsy 2/2020, benign, PSA 6.23  MRI 1/2021 with PI RADS 4 lesion 7mm right peripheral zone apex, stable compared to prior MRI--elected to defer repeat biopsy, PSA decreased    Review of Systems   Constitutional:  Negative for chills and fever.   Gastrointestinal:  Negative for abdominal pain.    Genitourinary:  Negative for difficulty urinating and hematuria.   Neurological:  Negative for numbness and headaches.   All other systems reviewed and are negative.      Current Outpatient Medications   Medication Sig Dispense Refill    amitriptyline (ELAVIL) 100 MG tablet Take 100 mg by mouth.      atorvastatin (LIPITOR) 20 MG tablet Take 20 mg by mouth.      calcipotriene (DOVONOX) 0.005 % cream Apply topically.      clobetasoL (TEMOVATE) 0.05 % cream Apply 1 application topically 2 (two) times daily.      levoFLOXacin (LEVAQUIN) 500 MG tablet Take 1 po 1 hour before biopsy 1 tablet 0    mesalamine (LIALDA) 1.2 gram TbEC       methylPREDNISolone (MEDROL DOSEPACK) 4 mg tablet use as directed 1 each 0    olmesartan (BENICAR) 20 MG tablet       sildenafiL (VIAGRA) 100 MG tablet Take 100 mg by mouth daily as needed.      zolpidem (AMBIEN CR) 12.5 MG CR tablet Take 12.5 mg by mouth.      aspirin (ECOTRIN) 81 MG EC tablet Take 1 tablet by mouth once daily.      primidone (MYSOLINE) 50 MG Tab Take 50 mg by mouth.      tamsulosin (FLOMAX) 0.4 mg Cap Take 1 capsule (0.4 mg total) by mouth once daily. 90 capsule 4     No current facility-administered medications for this visit.       Review of patient's allergies indicates:   Allergen Reactions    Sulfa (sulfonamide antibiotics) Rash       Past medical, family, and social history reviewed as documented in chart with pertinent positive medical, family, and social history detailed in HPI.    Physical Exam  Vitals:    03/12/25 1025   BP: 128/69   Pulse: 63   Resp: 17   Temp: 97.6 °F (36.4 °C)       General: Well-developed, well-nourished, in no acute distress  HEENT: Normocephalic, atraumatic, extraocular eye movements in tact  Neck: supple, trachea midline, no cervical or supraclavicular adenopathy  Respirations: Even and unlabored  Rectal: 6/14/23->40gram prostate without nodules or tenderness. No gross blood.   Extremities: Moves all extremities equally, atraumatic, no  clubbing, cyanosis, edema  Skin: warm and dry, no lesions  Psych: normal affect  Neuro: Alert and oriented x 3. Cranial nerves II-XII grossly intact    PVR: 149cc    UA: trace blood, otherwise negative    PSA    12/22/20: 11.0  5/28/21: 4.4  11/30/21: 4.3  6/30/22: 6.5  12/12/22: 5.2  6/7/23: 6.1  7/7/23: 6.0  12/15/23- 7.2  8/30/24: 6.3  3/5/25: 5.4      Assessment:   1. BPH with obstruction/lower urinary tract symptoms  POCT Urinalysis, Dipstick, Automated, W/O Scope    POCT Bladder Scan      2. Elevated PSA  Prostate Specific Antigen, Diagnostic      3. Microhematuria            Plan:  BPH with obstruction/lower urinary tract symptoms  -     POCT Urinalysis, Dipstick, Automated, W/O Scope  -     POCT Bladder Scan    Elevated PSA  -     Prostate Specific Antigen, Diagnostic; Future; Expected date: 09/12/2025    Microhematuria    Other orders  -     tamsulosin (FLOMAX) 0.4 mg Cap; Take 1 capsule (0.4 mg total) by mouth once daily.  Dispense: 90 capsule; Refill: 4    Pt declines JEOVANNY today.   Pt has wanted to avoid msh workup as well.   Pt is willing to try flomax at this point.     Follow up in about 6 months (around 9/12/2025) for labs before visit.

## 2025-04-15 ENCOUNTER — PATIENT MESSAGE (OUTPATIENT)
Dept: NEUROLOGY | Facility: CLINIC | Age: 75
End: 2025-04-15
Payer: MEDICARE

## 2025-04-28 ENCOUNTER — PATIENT MESSAGE (OUTPATIENT)
Dept: UROLOGY | Facility: CLINIC | Age: 75
End: 2025-04-28
Payer: MEDICARE

## 2025-04-28 ENCOUNTER — TELEPHONE (OUTPATIENT)
Dept: UROLOGY | Facility: CLINIC | Age: 75
End: 2025-04-28
Payer: MEDICARE

## 2025-04-28 NOTE — TELEPHONE ENCOUNTER
.Outgoing call placed to patient, patient verified name and date of birth, patient notified of this nurse's concerns about his swelling and fluid retention, he was notified that he needs to go get evaluated ASAP at the ED, his PCP or Cardiologist as soon as possible fluid retention could be a serious issue, he verbalized understanding and stated he will get evaluated.        Dr. Adams, hope that you and your family are doing well.     Over the past month i have gaind 8 pounds, diet and activity level has not changed.  My body is holding fluids, swelling in one leg and and feels like I have fluid around my heart.  I had some predizone 7 day packets and have taken it for the past 5 days and my weight has dropped  bisa it possible to get a fluid pill ?  Thank you

## 2025-09-03 ENCOUNTER — TELEPHONE (OUTPATIENT)
Dept: UROLOGY | Facility: CLINIC | Age: 75
End: 2025-09-03
Payer: MEDICARE